# Patient Record
Sex: MALE | Race: WHITE | Employment: OTHER | RURAL
[De-identification: names, ages, dates, MRNs, and addresses within clinical notes are randomized per-mention and may not be internally consistent; named-entity substitution may affect disease eponyms.]

---

## 2017-04-06 ENCOUNTER — OFFICE VISIT (OUTPATIENT)
Dept: PRIMARY CARE CLINIC | Age: 57
End: 2017-04-06
Payer: COMMERCIAL

## 2017-04-06 VITALS
DIASTOLIC BLOOD PRESSURE: 70 MMHG | OXYGEN SATURATION: 98 % | RESPIRATION RATE: 18 BRPM | BODY MASS INDEX: 31.74 KG/M2 | SYSTOLIC BLOOD PRESSURE: 132 MMHG | WEIGHT: 234 LBS | HEART RATE: 66 BPM

## 2017-04-06 DIAGNOSIS — G25.81 RLS (RESTLESS LEGS SYNDROME): ICD-10-CM

## 2017-04-06 DIAGNOSIS — Z13.29 THYROID DISORDER SCREEN: ICD-10-CM

## 2017-04-06 DIAGNOSIS — H61.23 BILATERAL IMPACTED CERUMEN: ICD-10-CM

## 2017-04-06 DIAGNOSIS — E55.9 VITAMIN D DEFICIENCY: ICD-10-CM

## 2017-04-06 DIAGNOSIS — E78.00 PURE HYPERCHOLESTEROLEMIA: Primary | ICD-10-CM

## 2017-04-06 PROCEDURE — 99214 OFFICE O/P EST MOD 30 MIN: CPT | Performed by: NURSE PRACTITIONER

## 2017-04-06 PROCEDURE — 69209 REMOVE IMPACTED EAR WAX UNI: CPT | Performed by: NURSE PRACTITIONER

## 2017-04-06 RX ORDER — ROPINIROLE 0.25 MG/1
TABLET, FILM COATED ORAL
Qty: 60 TABLET | Refills: 5 | Status: SHIPPED | OUTPATIENT
Start: 2017-04-06 | End: 2017-10-19 | Stop reason: SDUPTHER

## 2017-04-06 ASSESSMENT — ENCOUNTER SYMPTOMS
SINUS PRESSURE: 0
NAUSEA: 0
ABDOMINAL PAIN: 0
EYE DISCHARGE: 0
WHEEZING: 0
BACK PAIN: 0
VOMITING: 0
SHORTNESS OF BREATH: 0
COUGH: 0

## 2017-06-05 ENCOUNTER — OFFICE VISIT (OUTPATIENT)
Dept: PRIMARY CARE CLINIC | Age: 57
End: 2017-06-05
Payer: COMMERCIAL

## 2017-06-05 VITALS
RESPIRATION RATE: 20 BRPM | HEART RATE: 59 BPM | HEIGHT: 68 IN | BODY MASS INDEX: 34.95 KG/M2 | DIASTOLIC BLOOD PRESSURE: 80 MMHG | SYSTOLIC BLOOD PRESSURE: 130 MMHG | OXYGEN SATURATION: 96 % | WEIGHT: 230.6 LBS

## 2017-06-05 DIAGNOSIS — H61.22 IMPACTED CERUMEN OF LEFT EAR: ICD-10-CM

## 2017-06-05 DIAGNOSIS — H65.02 ACUTE SEROUS OTITIS MEDIA OF LEFT EAR, RECURRENCE NOT SPECIFIED: Primary | ICD-10-CM

## 2017-06-05 DIAGNOSIS — E78.00 PURE HYPERCHOLESTEROLEMIA: ICD-10-CM

## 2017-06-05 DIAGNOSIS — M19.079 ARTHRITIS OF BIG TOE: ICD-10-CM

## 2017-06-05 PROCEDURE — 99214 OFFICE O/P EST MOD 30 MIN: CPT | Performed by: NURSE PRACTITIONER

## 2017-06-05 RX ORDER — MELOXICAM 15 MG/1
TABLET ORAL
Qty: 30 TABLET | Refills: 5 | Status: SHIPPED | OUTPATIENT
Start: 2017-06-05 | End: 2018-01-15 | Stop reason: SDUPTHER

## 2017-06-05 RX ORDER — CEFDINIR 300 MG/1
300 CAPSULE ORAL 2 TIMES DAILY
Qty: 20 CAPSULE | Refills: 0 | Status: SHIPPED | OUTPATIENT
Start: 2017-06-05 | End: 2017-06-15

## 2017-06-05 RX ORDER — PRAVASTATIN SODIUM 20 MG
TABLET ORAL
Qty: 30 TABLET | Refills: 5 | Status: SHIPPED | OUTPATIENT
Start: 2017-06-05 | End: 2018-01-15 | Stop reason: SDUPTHER

## 2017-06-05 RX ORDER — CIPROFLOXACIN AND DEXAMETHASONE 3; 1 MG/ML; MG/ML
4 SUSPENSION/ DROPS AURICULAR (OTIC) 2 TIMES DAILY
Qty: 7.5 ML | Refills: 0 | Status: SHIPPED | OUTPATIENT
Start: 2017-06-05 | End: 2017-06-12

## 2017-06-05 ASSESSMENT — PATIENT HEALTH QUESTIONNAIRE - PHQ9
SUM OF ALL RESPONSES TO PHQ QUESTIONS 1-9: 0
SUM OF ALL RESPONSES TO PHQ9 QUESTIONS 1 & 2: 0
2. FEELING DOWN, DEPRESSED OR HOPELESS: 0
1. LITTLE INTEREST OR PLEASURE IN DOING THINGS: 0

## 2017-06-05 ASSESSMENT — ENCOUNTER SYMPTOMS
EYES NEGATIVE: 1
GASTROINTESTINAL NEGATIVE: 1
RESPIRATORY NEGATIVE: 1

## 2017-06-08 ENCOUNTER — HOSPITAL ENCOUNTER (OUTPATIENT)
Dept: OTHER | Age: 57
Discharge: HOME | End: 2017-06-08
Attending: NURSE PRACTITIONER | Admitting: NURSE PRACTITIONER

## 2017-06-08 DIAGNOSIS — G25.81 RLS (RESTLESS LEGS SYNDROME): ICD-10-CM

## 2017-06-08 DIAGNOSIS — Z13.29 THYROID DISORDER SCREEN: ICD-10-CM

## 2017-06-08 DIAGNOSIS — E55.9 VITAMIN D DEFICIENCY: ICD-10-CM

## 2017-06-08 DIAGNOSIS — E78.00 PURE HYPERCHOLESTEROLEMIA: ICD-10-CM

## 2017-06-08 LAB
A/G RATIO: 1.4 (ref 0.8–2)
ALBUMIN SERPL-MCNC: 4.4 G/DL (ref 3.4–4.8)
ALP BLD-CCNC: 97 U/L (ref 25–100)
ALT SERPL-CCNC: 33 U/L (ref 4–36)
ANION GAP SERPL CALCULATED.3IONS-SCNC: 12 MMOL/L (ref 3–16)
AST SERPL-CCNC: 26 U/L (ref 8–33)
BILIRUB SERPL-MCNC: 0.5 MG/DL (ref 0.3–1.2)
BUN BLDV-MCNC: 22 MG/DL (ref 6–20)
CALCIUM SERPL-MCNC: 9.5 MG/DL (ref 8.5–10.5)
CHLORIDE BLD-SCNC: 104 MMOL/L (ref 98–107)
CHOLESTEROL, TOTAL: 161 MG/DL (ref 0–200)
CO2: 25 MMOL/L (ref 20–30)
CREAT SERPL-MCNC: 0.9 MG/DL (ref 0.4–1.2)
FOLATE: 18.96 NG/ML
GFR AFRICAN AMERICAN: >59
GFR NON-AFRICAN AMERICAN: >59
GLOBULIN: 3.1 G/DL
GLUCOSE BLD-MCNC: 105 MG/DL (ref 74–106)
HCT VFR BLD CALC: 44.4 % (ref 40–54)
HDLC SERPL-MCNC: 32 MG/DL (ref 40–60)
HEMOGLOBIN: 15.8 G/DL (ref 13–18)
LDL CHOLESTEROL CALCULATED: 108 MG/DL
MCH RBC QN AUTO: 29.9 PG (ref 27–32)
MCHC RBC AUTO-ENTMCNC: 35.6 G/DL (ref 31–35)
MCV RBC AUTO: 83.9 FL (ref 80–100)
PDW BLD-RTO: 13.2 % (ref 11–16)
PLATELET # BLD: 234 K/UL (ref 150–400)
PMV BLD AUTO: 11 FL (ref 6–10)
POTASSIUM SERPL-SCNC: 5.1 MMOL/L (ref 3.4–5.1)
RBC # BLD: 5.29 M/UL (ref 4.5–6)
SODIUM BLD-SCNC: 141 MMOL/L (ref 136–145)
TOTAL PROTEIN: 7.5 G/DL (ref 6.4–8.3)
TRIGL SERPL-MCNC: 104 MG/DL (ref 0–249)
TSH SERPL DL<=0.05 MIU/L-ACNC: 2.7 UIU/ML (ref 0.35–5.5)
VITAMIN B-12: 506 PG/ML (ref 211–911)
VITAMIN D 25-HYDROXY: 25 (ref 32–100)
VLDLC SERPL CALC-MCNC: 21 MG/DL
WBC # BLD: 5.6 K/UL (ref 4–11)

## 2017-06-19 ENCOUNTER — OFFICE VISIT (OUTPATIENT)
Dept: PRIMARY CARE CLINIC | Age: 57
End: 2017-06-19
Payer: COMMERCIAL

## 2017-06-19 VITALS
BODY MASS INDEX: 35.16 KG/M2 | WEIGHT: 232 LBS | HEART RATE: 67 BPM | HEIGHT: 68 IN | OXYGEN SATURATION: 98 % | SYSTOLIC BLOOD PRESSURE: 124 MMHG | DIASTOLIC BLOOD PRESSURE: 70 MMHG

## 2017-06-19 DIAGNOSIS — H61.23 BILATERAL IMPACTED CERUMEN: ICD-10-CM

## 2017-06-19 DIAGNOSIS — H92.03 EAR PAIN, BILATERAL: Primary | ICD-10-CM

## 2017-06-19 PROCEDURE — 69210 REMOVE IMPACTED EAR WAX UNI: CPT | Performed by: NURSE PRACTITIONER

## 2017-06-19 PROCEDURE — 99213 OFFICE O/P EST LOW 20 MIN: CPT | Performed by: NURSE PRACTITIONER

## 2017-06-19 ASSESSMENT — ENCOUNTER SYMPTOMS
RHINORRHEA: 0
EYE REDNESS: 0
EYE ITCHING: 0
CONSTIPATION: 0
COUGH: 0
EYE DISCHARGE: 0
NAUSEA: 0
DIARRHEA: 0
SORE THROAT: 0
SHORTNESS OF BREATH: 0
ABDOMINAL PAIN: 0
VOMITING: 0

## 2017-06-27 DIAGNOSIS — M19.079 ARTHRITIS OF BIG TOE: ICD-10-CM

## 2017-06-28 RX ORDER — MELOXICAM 15 MG/1
TABLET ORAL
Qty: 30 TABLET | Refills: 5 | Status: SHIPPED | OUTPATIENT
Start: 2017-06-28 | End: 2017-10-18 | Stop reason: SDUPTHER

## 2017-06-28 ASSESSMENT — ENCOUNTER SYMPTOMS
GASTROINTESTINAL NEGATIVE: 1
EYES NEGATIVE: 1
RESPIRATORY NEGATIVE: 1

## 2017-10-19 ENCOUNTER — OFFICE VISIT (OUTPATIENT)
Dept: PRIMARY CARE CLINIC | Age: 57
End: 2017-10-19
Payer: COMMERCIAL

## 2017-10-19 VITALS
RESPIRATION RATE: 20 BRPM | OXYGEN SATURATION: 96 % | WEIGHT: 235.4 LBS | DIASTOLIC BLOOD PRESSURE: 82 MMHG | BODY MASS INDEX: 35.68 KG/M2 | HEIGHT: 68 IN | SYSTOLIC BLOOD PRESSURE: 140 MMHG | HEART RATE: 68 BPM

## 2017-10-19 DIAGNOSIS — E55.9 VITAMIN D DEFICIENCY: ICD-10-CM

## 2017-10-19 DIAGNOSIS — G25.81 RLS (RESTLESS LEGS SYNDROME): ICD-10-CM

## 2017-10-19 DIAGNOSIS — Z13.29 THYROID DISORDER SCREEN: ICD-10-CM

## 2017-10-19 DIAGNOSIS — Z13.1 ENCOUNTER FOR SCREENING FOR DIABETES MELLITUS: ICD-10-CM

## 2017-10-19 DIAGNOSIS — E78.00 PURE HYPERCHOLESTEROLEMIA: Primary | ICD-10-CM

## 2017-10-19 DIAGNOSIS — R03.0 ELEVATED BLOOD PRESSURE READING: ICD-10-CM

## 2017-10-19 PROCEDURE — 99214 OFFICE O/P EST MOD 30 MIN: CPT | Performed by: NURSE PRACTITIONER

## 2017-10-19 RX ORDER — ROPINIROLE 0.25 MG/1
TABLET, FILM COATED ORAL
Qty: 60 TABLET | Refills: 5 | Status: SHIPPED | OUTPATIENT
Start: 2017-10-19 | End: 2018-01-19 | Stop reason: SDUPTHER

## 2017-10-19 ASSESSMENT — ENCOUNTER SYMPTOMS
RESPIRATORY NEGATIVE: 1
EYES NEGATIVE: 1
GASTROINTESTINAL NEGATIVE: 1

## 2017-10-19 NOTE — PROGRESS NOTES
SUBJECTIVE:    Patient ID: Heath Donato is a 65 y.o. male. Chief Complaint   Patient presents with    Hyperlipidemia    Leg Pain     rls         HPI:  He returns about his cholesterol and is restless leg syndrome. He states he is taken all his medications he denies any problems with them. 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 He continues to have a lot of problems with his hearing but he doesn't want to get hearing aids. He knows he needs to go have his eyes evaluated. He says he'll go to the eye doctor soon because he's having a lot of trouble reading even regular print. Patient's medications, allergies, past medical, surgical, social and family histories were reviewed and updated as appropriate. Current Outpatient Prescriptions:     rOPINIRole (REQUIP) 0.25 MG tablet, One po nightly  increase to 2 nightly as needed. ., Disp: 60 tablet, Rfl: 5    meloxicam (MOBIC) 15 MG tablet, take 1 tablet by mouth once daily, Disp: 30 tablet, Rfl: 5    pravastatin (PRAVACHOL) 20 MG tablet, take 1 tablet by mouth once daily, Disp: 30 tablet, Rfl: 5    Review of Systems   Constitutional: Negative. HENT: Positive for hearing loss. Eyes: Negative. Vision changes     Respiratory: Negative. Cardiovascular: Negative. Gastrointestinal: Negative. Genitourinary: Negative. Musculoskeletal: Negative. Skin: Negative. Neurological: Negative. Psychiatric/Behavioral: Negative. OBJECTIVE:  BP (!) 140/82   Pulse 68   Resp 20   Ht 5' 8\" (1.727 m)   Wt 235 lb 6.4 oz (106.8 kg)   SpO2 96%   BMI 35.79 kg/m²    Physical Exam   Constitutional: He is oriented to person, place, and time. He appears well-developed and well-nourished. HENT:   Head: Normocephalic and atraumatic. Eyes: Conjunctivae and EOM are normal. Pupils are equal, round, and reactive to light. Neck: Normal range of motion.

## 2017-10-19 NOTE — PROGRESS NOTES
Have you seen any other physician or provider since your last visit? no    Have you had any other diagnostic tests since your last visit? no    Have you changed or stopped any medications since your last visit? No    Patient here for follow up on hyperlipidemia and rls, he did not have labs done.

## 2017-11-21 ENCOUNTER — HOSPITAL ENCOUNTER (OUTPATIENT)
Dept: OTHER | Age: 57
Discharge: HOME | End: 2017-11-21
Attending: NURSE PRACTITIONER | Admitting: NURSE PRACTITIONER

## 2017-11-21 ENCOUNTER — TELEPHONE (OUTPATIENT)
Dept: PRIMARY CARE CLINIC | Age: 57
End: 2017-11-21

## 2017-11-21 DIAGNOSIS — Z13.1 ENCOUNTER FOR SCREENING FOR DIABETES MELLITUS: ICD-10-CM

## 2017-11-21 DIAGNOSIS — E55.9 VITAMIN D DEFICIENCY: ICD-10-CM

## 2017-11-21 DIAGNOSIS — E78.00 PURE HYPERCHOLESTEROLEMIA: ICD-10-CM

## 2017-11-21 DIAGNOSIS — Z13.29 THYROID DISORDER SCREEN: ICD-10-CM

## 2017-11-21 LAB
A/G RATIO: 1.6 (ref 0.8–2)
ALBUMIN SERPL-MCNC: 4.5 G/DL (ref 3.4–4.8)
ALP BLD-CCNC: 103 U/L (ref 25–100)
ALT SERPL-CCNC: 33 U/L (ref 4–36)
ANION GAP SERPL CALCULATED.3IONS-SCNC: 11 MMOL/L (ref 3–16)
AST SERPL-CCNC: 22 U/L (ref 8–33)
BILIRUB SERPL-MCNC: 0.4 MG/DL (ref 0.3–1.2)
BUN BLDV-MCNC: 23 MG/DL (ref 6–20)
CALCIUM SERPL-MCNC: 9.8 MG/DL (ref 8.5–10.5)
CHLORIDE BLD-SCNC: 103 MMOL/L (ref 98–107)
CHOLESTEROL, TOTAL: 169 MG/DL (ref 0–200)
CO2: 27 MMOL/L (ref 20–30)
CREAT SERPL-MCNC: 0.9 MG/DL (ref 0.4–1.2)
FOLATE: 12.45 NG/ML
GFR AFRICAN AMERICAN: >59
GFR NON-AFRICAN AMERICAN: >59
GLOBULIN: 2.9 G/DL
GLUCOSE BLD-MCNC: 99 MG/DL (ref 74–106)
HCT VFR BLD CALC: 44.2 % (ref 40–54)
HDLC SERPL-MCNC: 29 MG/DL (ref 40–60)
HEMOGLOBIN: 15.7 G/DL (ref 13–18)
LDL CHOLESTEROL CALCULATED: 110 MG/DL
MCH RBC QN AUTO: 30.4 PG (ref 27–32)
MCHC RBC AUTO-ENTMCNC: 35.5 G/DL (ref 31–35)
MCV RBC AUTO: 85.7 FL (ref 80–100)
PDW BLD-RTO: 13.4 % (ref 11–16)
PLATELET # BLD: 227 K/UL (ref 150–400)
PMV BLD AUTO: 10.9 FL (ref 6–10)
POTASSIUM SERPL-SCNC: 5.1 MMOL/L (ref 3.4–5.1)
RBC # BLD: 5.16 M/UL (ref 4.5–6)
SODIUM BLD-SCNC: 141 MMOL/L (ref 136–145)
TOTAL PROTEIN: 7.4 G/DL (ref 6.4–8.3)
TRIGL SERPL-MCNC: 151 MG/DL (ref 0–249)
TSH SERPL DL<=0.05 MIU/L-ACNC: 2.31 UIU/ML (ref 0.35–5.5)
VITAMIN B-12: 370 PG/ML (ref 211–911)
VITAMIN D 25-HYDROXY: 19 (ref 32–100)
VLDLC SERPL CALC-MCNC: 30 MG/DL
WBC # BLD: 5.5 K/UL (ref 4–11)

## 2017-11-21 NOTE — TELEPHONE ENCOUNTER
----- Message from EULA Gamino sent at 11/21/2017 11:48 AM EST -----  Vit d is low. Vit D3 5000 iu daily.

## 2018-01-15 DIAGNOSIS — M19.079 ARTHRITIS OF BIG TOE: ICD-10-CM

## 2018-01-15 DIAGNOSIS — E78.00 PURE HYPERCHOLESTEROLEMIA: ICD-10-CM

## 2018-01-15 RX ORDER — PRAVASTATIN SODIUM 20 MG
TABLET ORAL
Qty: 30 TABLET | Refills: 5 | Status: SHIPPED | OUTPATIENT
Start: 2018-01-15 | End: 2018-01-19 | Stop reason: SDUPTHER

## 2018-01-15 RX ORDER — MELOXICAM 15 MG/1
TABLET ORAL
Qty: 30 TABLET | Refills: 5 | Status: SHIPPED | OUTPATIENT
Start: 2018-01-15 | End: 2018-08-10 | Stop reason: SDUPTHER

## 2018-01-15 RX ORDER — PRAVASTATIN SODIUM 20 MG
TABLET ORAL
Qty: 30 TABLET | Refills: 5 | Status: SHIPPED | OUTPATIENT
Start: 2018-01-15 | End: 2018-08-10 | Stop reason: SDUPTHER

## 2018-01-15 RX ORDER — MELOXICAM 15 MG/1
TABLET ORAL
Qty: 30 TABLET | Refills: 5 | Status: SHIPPED | OUTPATIENT
Start: 2018-01-15 | End: 2018-01-19 | Stop reason: SDUPTHER

## 2018-01-19 ENCOUNTER — OFFICE VISIT (OUTPATIENT)
Dept: PRIMARY CARE CLINIC | Age: 58
End: 2018-01-19
Payer: COMMERCIAL

## 2018-01-19 VITALS
HEART RATE: 76 BPM | SYSTOLIC BLOOD PRESSURE: 136 MMHG | DIASTOLIC BLOOD PRESSURE: 72 MMHG | WEIGHT: 237.2 LBS | RESPIRATION RATE: 18 BRPM | BODY MASS INDEX: 36.07 KG/M2 | OXYGEN SATURATION: 97 %

## 2018-01-19 DIAGNOSIS — G25.81 RLS (RESTLESS LEGS SYNDROME): ICD-10-CM

## 2018-01-19 DIAGNOSIS — E78.00 PURE HYPERCHOLESTEROLEMIA: Primary | ICD-10-CM

## 2018-01-19 DIAGNOSIS — E55.9 VITAMIN D DEFICIENCY: ICD-10-CM

## 2018-01-19 DIAGNOSIS — Z13.29 THYROID DISORDER SCREEN: ICD-10-CM

## 2018-01-19 PROCEDURE — 99213 OFFICE O/P EST LOW 20 MIN: CPT | Performed by: NURSE PRACTITIONER

## 2018-01-19 RX ORDER — ROPINIROLE 0.25 MG/1
TABLET, FILM COATED ORAL
Qty: 60 TABLET | Refills: 5 | Status: SHIPPED | OUTPATIENT
Start: 2018-01-19 | End: 2019-02-28 | Stop reason: SDUPTHER

## 2018-01-19 RX ORDER — ERGOCALCIFEROL 1.25 MG/1
50000 CAPSULE ORAL WEEKLY
Qty: 4 CAPSULE | Refills: 5 | Status: SHIPPED | OUTPATIENT
Start: 2018-01-19 | End: 2018-04-19 | Stop reason: SDUPTHER

## 2018-01-29 ASSESSMENT — ENCOUNTER SYMPTOMS
GASTROINTESTINAL NEGATIVE: 1
EYES NEGATIVE: 1
RESPIRATORY NEGATIVE: 1

## 2018-01-29 NOTE — PROGRESS NOTES
gallop and no friction rub. No murmur heard. Pulmonary/Chest: Effort normal and breath sounds normal. No respiratory distress. He has no wheezes. He has no rales. Abdominal: Soft. Bowel sounds are normal. He exhibits no distension. There is no tenderness. There is no guarding. Musculoskeletal: He exhibits no edema or tenderness. Neurological: He is alert and oriented to person, place, and time. Skin: Skin is warm and dry. No rash noted. Psychiatric: He has a normal mood and affect. Judgment normal.   Nursing note and vitals reviewed. No results found for requested labs within last 30 days. LDL Calculated (mg/dL)   Date Value   11/21/2017 110         Lab Results   Component Value Date    WBC 5.5 11/21/2017    HGB 15.7 11/21/2017    HCT 44.2 11/21/2017    MCV 85.7 11/21/2017     11/21/2017       Lab Results   Component Value Date    TSH 2.31 11/21/2017         ASSESSMENT/PLAN:     1. RLS (restless legs syndrome)    - rOPINIRole (REQUIP) 0.25 MG tablet; One po nightly  increase to 2 nightly as needed. .  Dispense: 60 tablet; Refill: 5    2. Pure hypercholesterolemia    - Comprehensive Metabolic Panel; Future  - CBC; Future  - Lipid Panel; Future    3. Vitamin D deficiency    - Vitamin B12 & Folate; Future  - Vitamin D 25 Hydroxy; Future    4. Thyroid disorder screen    - TSH without Reflex;  Future

## 2018-04-02 ENCOUNTER — HOSPITAL ENCOUNTER (OUTPATIENT)
Dept: OTHER | Age: 58
Discharge: HOME | End: 2018-04-02
Attending: NURSE PRACTITIONER | Admitting: NURSE PRACTITIONER

## 2018-04-02 DIAGNOSIS — Z13.29 THYROID DISORDER SCREEN: ICD-10-CM

## 2018-04-02 DIAGNOSIS — E55.9 VITAMIN D DEFICIENCY: ICD-10-CM

## 2018-04-02 DIAGNOSIS — E78.00 PURE HYPERCHOLESTEROLEMIA: ICD-10-CM

## 2018-04-02 LAB
A/G RATIO: 1.8 (ref 0.8–2)
ALBUMIN SERPL-MCNC: 4.7 G/DL (ref 3.4–4.8)
ALP BLD-CCNC: 102 U/L (ref 25–100)
ALT SERPL-CCNC: 28 U/L (ref 4–36)
ANION GAP SERPL CALCULATED.3IONS-SCNC: 12 MMOL/L (ref 3–16)
AST SERPL-CCNC: 21 U/L (ref 8–33)
BILIRUB SERPL-MCNC: 0.6 MG/DL (ref 0.3–1.2)
BUN BLDV-MCNC: 23 MG/DL (ref 6–20)
CALCIUM SERPL-MCNC: 9.5 MG/DL (ref 8.5–10.5)
CHLORIDE BLD-SCNC: 101 MMOL/L (ref 98–107)
CHOLESTEROL, TOTAL: 167 MG/DL (ref 0–200)
CO2: 26 MMOL/L (ref 20–30)
CREAT SERPL-MCNC: 1 MG/DL (ref 0.4–1.2)
FOLATE: 13.78 NG/ML
GFR AFRICAN AMERICAN: >59
GFR NON-AFRICAN AMERICAN: >59
GLOBULIN: 2.6 G/DL
GLUCOSE BLD-MCNC: 94 MG/DL (ref 74–106)
HCT VFR BLD CALC: 46.2 % (ref 40–54)
HDLC SERPL-MCNC: 31 MG/DL (ref 40–60)
HEMOGLOBIN: 15.6 G/DL (ref 13–18)
LDL CHOLESTEROL CALCULATED: 108 MG/DL
MCH RBC QN AUTO: 29.6 PG (ref 27–32)
MCHC RBC AUTO-ENTMCNC: 33.8 G/DL (ref 31–35)
MCV RBC AUTO: 87.7 FL (ref 80–100)
PDW BLD-RTO: 12.9 % (ref 11–16)
PLATELET # BLD: 244 K/UL (ref 150–400)
PMV BLD AUTO: 10.8 FL (ref 6–10)
POTASSIUM SERPL-SCNC: 5.2 MMOL/L (ref 3.4–5.1)
RBC # BLD: 5.27 M/UL (ref 4.5–6)
SODIUM BLD-SCNC: 139 MMOL/L (ref 136–145)
TOTAL PROTEIN: 7.3 G/DL (ref 6.4–8.3)
TRIGL SERPL-MCNC: 139 MG/DL (ref 0–249)
TSH SERPL DL<=0.05 MIU/L-ACNC: 3.62 UIU/ML (ref 0.35–5.5)
VITAMIN B-12: 499 PG/ML (ref 211–911)
VITAMIN D 25-HYDROXY: 29.9 (ref 32–100)
VLDLC SERPL CALC-MCNC: 28 MG/DL
WBC # BLD: 5.9 K/UL (ref 4–11)

## 2018-04-19 ENCOUNTER — OFFICE VISIT (OUTPATIENT)
Dept: PRIMARY CARE CLINIC | Age: 58
End: 2018-04-19
Payer: COMMERCIAL

## 2018-04-19 ENCOUNTER — HOSPITAL ENCOUNTER (OUTPATIENT)
Dept: OTHER | Age: 58
Discharge: HOME | End: 2018-04-19
Attending: NURSE PRACTITIONER | Admitting: NURSE PRACTITIONER

## 2018-04-19 VITALS
HEIGHT: 68 IN | BODY MASS INDEX: 35.8 KG/M2 | TEMPERATURE: 97.6 F | HEART RATE: 59 BPM | DIASTOLIC BLOOD PRESSURE: 86 MMHG | WEIGHT: 236.2 LBS | OXYGEN SATURATION: 97 % | SYSTOLIC BLOOD PRESSURE: 134 MMHG | RESPIRATION RATE: 16 BRPM

## 2018-04-19 DIAGNOSIS — G89.29 CHRONIC RIGHT SHOULDER PAIN: ICD-10-CM

## 2018-04-19 DIAGNOSIS — M25.511 CHRONIC RIGHT SHOULDER PAIN: ICD-10-CM

## 2018-04-19 DIAGNOSIS — G89.29 CHRONIC RIGHT SHOULDER PAIN: Primary | ICD-10-CM

## 2018-04-19 DIAGNOSIS — M25.511 CHRONIC RIGHT SHOULDER PAIN: Primary | ICD-10-CM

## 2018-04-19 PROCEDURE — 99214 OFFICE O/P EST MOD 30 MIN: CPT | Performed by: NURSE PRACTITIONER

## 2018-04-19 RX ORDER — METHOCARBAMOL 500 MG/1
500 TABLET, FILM COATED ORAL 4 TIMES DAILY
Qty: 40 TABLET | Refills: 0 | Status: SHIPPED | OUTPATIENT
Start: 2018-04-19 | End: 2018-04-29

## 2018-04-19 RX ORDER — TRAMADOL HYDROCHLORIDE 50 MG/1
50 TABLET ORAL EVERY 4 HOURS PRN
Qty: 30 TABLET | Refills: 0 | Status: SHIPPED | OUTPATIENT
Start: 2018-04-19 | End: 2018-04-24

## 2018-04-19 RX ORDER — ERGOCALCIFEROL 1.25 MG/1
50000 CAPSULE ORAL WEEKLY
Qty: 4 CAPSULE | Refills: 5 | Status: SHIPPED | OUTPATIENT
Start: 2018-04-19 | End: 2019-02-25 | Stop reason: SDUPTHER

## 2018-04-19 ASSESSMENT — ENCOUNTER SYMPTOMS
EYES NEGATIVE: 1
GASTROINTESTINAL NEGATIVE: 1
RESPIRATORY NEGATIVE: 1

## 2018-04-25 ENCOUNTER — TELEPHONE (OUTPATIENT)
Dept: PRIMARY CARE CLINIC | Age: 58
End: 2018-04-25

## 2018-05-16 ENCOUNTER — OFFICE VISIT (OUTPATIENT)
Dept: PRIMARY CARE CLINIC | Age: 58
End: 2018-05-16
Payer: COMMERCIAL

## 2018-05-16 VITALS
HEART RATE: 76 BPM | DIASTOLIC BLOOD PRESSURE: 86 MMHG | RESPIRATION RATE: 16 BRPM | HEIGHT: 68 IN | SYSTOLIC BLOOD PRESSURE: 126 MMHG | OXYGEN SATURATION: 95 % | TEMPERATURE: 97.5 F | BODY MASS INDEX: 35.22 KG/M2 | WEIGHT: 232.4 LBS

## 2018-05-16 DIAGNOSIS — G89.29 CHRONIC RIGHT SHOULDER PAIN: Primary | ICD-10-CM

## 2018-05-16 DIAGNOSIS — M25.511 CHRONIC RIGHT SHOULDER PAIN: Primary | ICD-10-CM

## 2018-05-16 PROCEDURE — 99213 OFFICE O/P EST LOW 20 MIN: CPT | Performed by: NURSE PRACTITIONER

## 2018-05-16 RX ORDER — TRAMADOL HYDROCHLORIDE 50 MG/1
50 TABLET ORAL
COMMUNITY
Start: 2018-04-19 | End: 2019-03-11

## 2018-05-16 RX ORDER — METHOCARBAMOL 500 MG/1
TABLET, FILM COATED ORAL
COMMUNITY
Start: 2018-04-19 | End: 2019-03-11 | Stop reason: ALTCHOICE

## 2018-05-16 ASSESSMENT — ENCOUNTER SYMPTOMS
GASTROINTESTINAL NEGATIVE: 1
EYES NEGATIVE: 1
RESPIRATORY NEGATIVE: 1

## 2018-06-07 ENCOUNTER — HOSPITAL ENCOUNTER (OUTPATIENT)
Dept: PHYSICAL THERAPY | Age: 58
Discharge: HOME | End: 2018-06-30
Admitting: NURSE PRACTITIONER

## 2018-06-07 ASSESSMENT — PAIN DESCRIPTION - LOCATION: LOCATION: SHOULDER

## 2018-06-07 ASSESSMENT — PAIN DESCRIPTION - PROGRESSION: CLINICAL_PROGRESSION: NOT CHANGED

## 2018-06-07 ASSESSMENT — PAIN DESCRIPTION - PAIN TYPE: TYPE: ACUTE PAIN

## 2018-06-07 ASSESSMENT — PAIN DESCRIPTION - FREQUENCY: FREQUENCY: INTERMITTENT

## 2018-06-07 ASSESSMENT — PAIN SCALES - GENERAL: PAINLEVEL_OUTOF10: 3

## 2018-06-07 ASSESSMENT — PAIN DESCRIPTION - ONSET: ONSET: ON-GOING

## 2018-06-07 ASSESSMENT — PAIN DESCRIPTION - ORIENTATION: ORIENTATION: RIGHT

## 2018-06-07 NOTE — PROGRESS NOTES
Physical Therapy  Initial Assessment  Date: 18  Patient Name: Terrell Mcardle  MRN: [id number]  : 1960     Treatment Diagnosis: Right shoulder pain; RTC tendinitis; joint stiffness    Restrictions  Restrictions/Precautions  Restrictions/Precautions: General Precautions  Required Braces or Orthoses?: No    Subjective   General  Chart Reviewed: Yes  Patient assessed for rehabilitation services?: Yes  Response To Previous Treatment: Not applicable  Family / Caregiver Present: No  Referring Practitioner: EULA Rod Sa  Diagnosis: Right shoulder pain  Follows Commands: Within Functional Limits  General Comment  Comments: denies specific injury, but 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   Pain Screening  Patient Currently in Pain: Yes  Pain Assessment  Clinical Progression: Not changed  Vital Signs  Patient Currently in Pain: Yes    Vision/Hearing  Vision  Vision: Within Functional Limits  Hearing  Hearing: Within functional limits    Orientation  Orientation  Overall Orientation Status: Within Normal Limits    Social/Functional History  Social/Functional History  Lives With: Spouse  Active : Yes  Mode of Transportation: Car  Occupation: Self employed  Type of occupation: Smith; farming  Objective     Observation/Palpation  Posture: Fair  Palpation: Tenderness over right UT, Re-education, Home Exercise Program, Manual Therapy - Soft Tissue Mobilization, Modalities    G-Code  PT G-Codes  Functional Assessment Tool Used: Quick DASH  Score: 21  Functional Limitation: Carrying, moving and handling objects  Carrying, Moving and Handling Objects Current Status (): At least 20 percent but less than 40 percent impaired, limited or restricted  Carrying, Moving and Handling Objects Goal Status (): At least 1 percent but less than 20 percent impaired, limited or restricted                   Goals  Short term goals  Time Frame for Short term goals: 3-4 weeks  Short term goal 1: Achieve right shoulder pain at or less than 2/10 with routine daily activities. Short term goal 2: Achieve right shoulder AROM: FF = 160, ABD = 140, IR =70, ER = 75. Short term goal 3: Achieve 4+/5 right shoulder strength in MMG's. Short term goal 4: Independent with HEP. Short term goal 5: Achieve a Quick DASH score of 16 or better. Long term goals  Time Frame for Long term goals : 6 weeks  Long term goal 1: Achieve right shoulder pain at or less than 1/10 with routine daily activities. Long term goal 2: Achieve 5-/5 right shoulder strength in MMG's. Long term goal 3: Achieve a Quick DASH score of 10 or better. Patient Goals   Patient goals : alleviate shoulder pain       Therapy Time   Individual Concurrent Group Co-treatment   Time In           Time Out           Minutes                   Electronically signed by Luna Peters PT on 6/8/2018 at 46:98 AM      Certification of Medical Necessity: It will be understood that this treatment plan is certified medically necessary by the documenting therapist and referring physician mentioned in this report. Unless the physician indicated otherwise through written correspondence with our office, all further referrals will act as certification of medical necessity on this treatment plan.       Thank you for this referral.  If you have questions regarding this plan

## 2018-06-08 ASSESSMENT — PAIN DESCRIPTION - PROGRESSION: CLINICAL_PROGRESSION: NOT CHANGED

## 2018-06-22 ENCOUNTER — HOSPITAL ENCOUNTER (OUTPATIENT)
Dept: PHYSICAL THERAPY | Age: 58
Discharge: HOME | End: 2018-06-25
Admitting: NURSE PRACTITIONER

## 2018-07-01 ENCOUNTER — HOSPITAL ENCOUNTER (OUTPATIENT)
Dept: OTHER | Age: 58
Discharge: HOME | End: 2018-07-01
Attending: NURSE PRACTITIONER | Admitting: NURSE PRACTITIONER

## 2018-08-10 DIAGNOSIS — E78.00 PURE HYPERCHOLESTEROLEMIA: ICD-10-CM

## 2018-08-10 DIAGNOSIS — M19.079 ARTHRITIS OF BIG TOE: ICD-10-CM

## 2018-08-10 RX ORDER — PRAVASTATIN SODIUM 20 MG
TABLET ORAL
Qty: 30 TABLET | Refills: 5 | Status: SHIPPED | OUTPATIENT
Start: 2018-08-10 | End: 2019-02-25 | Stop reason: SDUPTHER

## 2018-08-10 RX ORDER — MELOXICAM 15 MG/1
TABLET ORAL
Qty: 30 TABLET | Refills: 5 | Status: SHIPPED | OUTPATIENT
Start: 2018-08-10 | End: 2019-02-25 | Stop reason: SDUPTHER

## 2018-12-20 NOTE — DISCHARGE SUMMARY
Physical Therapy Discharge Summary    Patient attended initial evaluation + 1 visit, and did not return for further treatment. Patient will be discharged due to not returning to complete established plan of care. Goals were therefore not met.   Electronically signed by Edmund Izquierdo PT on 12/20/2018 at 3:01 PM

## 2019-02-25 DIAGNOSIS — M19.079 ARTHRITIS OF BIG TOE: ICD-10-CM

## 2019-02-25 DIAGNOSIS — E78.00 PURE HYPERCHOLESTEROLEMIA: ICD-10-CM

## 2019-02-25 RX ORDER — ERGOCALCIFEROL 1.25 MG/1
50000 CAPSULE ORAL WEEKLY
Qty: 4 CAPSULE | Refills: 0 | Status: SHIPPED | OUTPATIENT
Start: 2019-02-25 | End: 2019-03-11 | Stop reason: SDUPTHER

## 2019-02-25 RX ORDER — MELOXICAM 15 MG/1
TABLET ORAL
Qty: 30 TABLET | Refills: 0 | Status: SHIPPED | OUTPATIENT
Start: 2019-02-25 | End: 2019-03-11 | Stop reason: SDUPTHER

## 2019-02-25 RX ORDER — PRAVASTATIN SODIUM 20 MG
TABLET ORAL
Qty: 30 TABLET | Refills: 0 | Status: SHIPPED | OUTPATIENT
Start: 2019-02-25 | End: 2019-03-11 | Stop reason: SDUPTHER

## 2019-02-28 DIAGNOSIS — G25.81 RLS (RESTLESS LEGS SYNDROME): ICD-10-CM

## 2019-02-28 RX ORDER — ROPINIROLE 0.25 MG/1
TABLET, FILM COATED ORAL
Qty: 60 TABLET | Refills: 2 | Status: SHIPPED | OUTPATIENT
Start: 2019-02-28 | End: 2019-03-11 | Stop reason: SDUPTHER

## 2019-03-11 ENCOUNTER — APPOINTMENT (OUTPATIENT)
Dept: LAB | Facility: HOSPITAL | Age: 59
End: 2019-03-11

## 2019-03-11 ENCOUNTER — TRANSCRIBE ORDERS (OUTPATIENT)
Dept: ADMINISTRATIVE | Facility: HOSPITAL | Age: 59
End: 2019-03-11

## 2019-03-11 ENCOUNTER — OFFICE VISIT (OUTPATIENT)
Dept: PRIMARY CARE CLINIC | Age: 59
End: 2019-03-11
Payer: COMMERCIAL

## 2019-03-11 VITALS
HEIGHT: 68 IN | TEMPERATURE: 97.8 F | BODY MASS INDEX: 35.31 KG/M2 | HEART RATE: 58 BPM | SYSTOLIC BLOOD PRESSURE: 112 MMHG | OXYGEN SATURATION: 95 % | DIASTOLIC BLOOD PRESSURE: 86 MMHG | WEIGHT: 233 LBS | RESPIRATION RATE: 16 BRPM

## 2019-03-11 DIAGNOSIS — Z13.29 SCREENING FOR THYROID DISORDER: Primary | ICD-10-CM

## 2019-03-11 DIAGNOSIS — E55.9 MILD VITAMIN D DEFICIENCY: ICD-10-CM

## 2019-03-11 DIAGNOSIS — E55.9 VITAMIN D DEFICIENCY: ICD-10-CM

## 2019-03-11 DIAGNOSIS — E78.00 PURE HYPERCHOLESTEROLEMIA: ICD-10-CM

## 2019-03-11 DIAGNOSIS — E78.00 PURE HYPERCHOLESTEROLEMIA: Primary | ICD-10-CM

## 2019-03-11 DIAGNOSIS — M19.079 ARTHRITIS OF BIG TOE: ICD-10-CM

## 2019-03-11 DIAGNOSIS — Z13.29 THYROID DISORDER SCREEN: ICD-10-CM

## 2019-03-11 DIAGNOSIS — G25.81 RLS (RESTLESS LEGS SYNDROME): ICD-10-CM

## 2019-03-11 LAB
25(OH)D3 SERPL-MCNC: 30.1 NG/ML
ALBUMIN SERPL-MCNC: 4.6 G/DL
ALBUMIN SERPL-MCNC: 4.6 G/DL (ref 3.5–5)
ALBUMIN/GLOB SERPL: 1.4 G/DL (ref 1–2)
ALP BLD-CCNC: 95 U/L
ALP SERPL-CCNC: 95 U/L (ref 38–126)
ALT SERPL W P-5'-P-CCNC: 39 U/L (ref 13–69)
ALT SERPL-CCNC: 39 U/L
ANION GAP SERPL CALCULATED.3IONS-SCNC: 12.8 MMOL/L
ANION GAP SERPL CALCULATED.3IONS-SCNC: 12.8 MMOL/L (ref 10–20)
AST SERPL-CCNC: 31 U/L
AST SERPL-CCNC: 31 U/L (ref 15–46)
BASOPHILS # BLD AUTO: 0.03 10*3/MM3 (ref 0–0.2)
BASOPHILS NFR BLD AUTO: 0.6 % (ref 0–2.5)
BILIRUB SERPL-MCNC: 0.5 MG/DL (ref 0.1–1.4)
BILIRUB SERPL-MCNC: 0.5 MG/DL (ref 0.2–1.3)
BUN BLD-MCNC: 25 MG/DL (ref 7–20)
BUN BLDV-MCNC: 25 MG/DL
BUN/CREAT SERPL: 27.8 (ref 6.3–21.9)
CALCIUM SERPL-MCNC: 9.9 MG/DL
CALCIUM SPEC-SCNC: 9.9 MG/DL (ref 8.4–10.2)
CHLORIDE BLD-SCNC: 106 MMOL/L
CHLORIDE SERPL-SCNC: 106 MMOL/L (ref 98–107)
CHOLEST SERPL-MCNC: 175 MG/DL (ref 0–199)
CHOLESTEROL, TOTAL: 175 MG/DL
CHOLESTEROL/HDL RATIO: 3.28
CO2 SERPL-SCNC: 27 MMOL/L (ref 26–30)
CO2: 27 MMOL/L
CREAT BLD-MCNC: 0.9 MG/DL (ref 0.6–1.3)
CREAT SERPL-MCNC: 0.9 MG/DL
DEPRECATED RDW RBC AUTO: 41.5 FL (ref 37–54)
EOSINOPHIL # BLD AUTO: 0.14 10*3/MM3 (ref 0–0.7)
EOSINOPHIL NFR BLD AUTO: 2.6 % (ref 0–7)
ERYTHROCYTE [DISTWIDTH] IN BLOOD BY AUTOMATED COUNT: 12.8 % (ref 11.5–14.5)
FOLATE: NORMAL
GFR CALCULATED: ABNORMAL
GFR SERPL CREATININE-BSD FRML MDRD: 87 ML/MIN/1.73
GLOBULIN UR ELPH-MCNC: 3.4 GM/DL
GLUCOSE BLD-MCNC: 102 MG/DL
GLUCOSE BLD-MCNC: 102 MG/DL (ref 74–98)
HCT VFR BLD AUTO: 46.3 % (ref 42–52)
HDLC SERPL-MCNC: 34 MG/DL (ref 35–70)
HDLC SERPL-MCNC: 34 MG/DL (ref 40–60)
HGB BLD-MCNC: 15.7 G/DL (ref 14–18)
IMM GRANULOCYTES # BLD AUTO: 0.01 10*3/MM3 (ref 0–0.06)
IMM GRANULOCYTES NFR BLD AUTO: 0.2 % (ref 0–0.6)
LDL CHOLESTEROL CALCULATED: 112 MG/DL (ref 0–160)
LDLC SERPL CALC-MCNC: 112 MG/DL (ref 0–99)
LDLC/HDLC SERPL: 3.28 {RATIO}
LYMPHOCYTES # BLD AUTO: 1.84 10*3/MM3 (ref 0.6–3.4)
LYMPHOCYTES NFR BLD AUTO: 34.8 % (ref 10–50)
MCH RBC QN AUTO: 29.8 PG (ref 27–31)
MCHC RBC AUTO-ENTMCNC: 33.9 G/DL (ref 30–37)
MCV RBC AUTO: 88 FL (ref 80–94)
MONOCYTES # BLD AUTO: 0.42 10*3/MM3 (ref 0–0.9)
MONOCYTES NFR BLD AUTO: 7.9 % (ref 0–12)
NEUTROPHILS # BLD AUTO: 2.85 10*3/MM3 (ref 2–6.9)
NEUTROPHILS NFR BLD AUTO: 53.9 % (ref 37–80)
NRBC BLD AUTO-RTO: 0 /100 WBC (ref 0–0)
PLATELET # BLD AUTO: 230 10*3/MM3 (ref 130–400)
PMV BLD AUTO: 10.7 FL (ref 6–12)
POTASSIUM BLD-SCNC: 4.8 MMOL/L (ref 3.5–5.1)
POTASSIUM SERPL-SCNC: 4.8 MMOL/L
PROT SERPL-MCNC: 8 G/DL (ref 6.3–8.2)
RBC # BLD AUTO: 5.26 10*6/MM3 (ref 4.7–6.1)
SODIUM BLD-SCNC: 141 MMOL/L
SODIUM BLD-SCNC: 141 MMOL/L (ref 137–145)
TOTAL PROTEIN: 8
TRIGL SERPL-MCNC: 147 MG/DL
TRIGL SERPL-MCNC: 147 MG/DL
TSH SERPL DL<=0.05 MIU/L-ACNC: 2.51 MIU/ML (ref 0.47–4.68)
TSH SERPL DL<=0.05 MIU/L-ACNC: 2.51 UIU/ML
VIT B12 BLD-MCNC: 365 PG/ML (ref 239–931)
VITAMIN B-12: 365
VITAMIN D 25-HYDROXY: 30.1
VITAMIN D2, 25 HYDROXY: NORMAL
VITAMIN D3,25 HYDROXY: NORMAL
VLDLC SERPL CALC-MCNC: 29.4 MG/DL
VLDLC SERPL-MCNC: 29.4 MG/DL
WBC NRBC COR # BLD: 5.29 10*3/MM3 (ref 4.8–10.8)

## 2019-03-11 PROCEDURE — 84443 ASSAY THYROID STIM HORMONE: CPT | Performed by: NURSE PRACTITIONER

## 2019-03-11 PROCEDURE — 99213 OFFICE O/P EST LOW 20 MIN: CPT | Performed by: NURSE PRACTITIONER

## 2019-03-11 PROCEDURE — 80053 COMPREHEN METABOLIC PANEL: CPT | Performed by: NURSE PRACTITIONER

## 2019-03-11 PROCEDURE — 82306 VITAMIN D 25 HYDROXY: CPT | Performed by: NURSE PRACTITIONER

## 2019-03-11 PROCEDURE — 36415 COLL VENOUS BLD VENIPUNCTURE: CPT | Performed by: NURSE PRACTITIONER

## 2019-03-11 PROCEDURE — 80061 LIPID PANEL: CPT | Performed by: NURSE PRACTITIONER

## 2019-03-11 PROCEDURE — 82607 VITAMIN B-12: CPT | Performed by: NURSE PRACTITIONER

## 2019-03-11 PROCEDURE — 85025 COMPLETE CBC W/AUTO DIFF WBC: CPT | Performed by: NURSE PRACTITIONER

## 2019-03-11 RX ORDER — MELOXICAM 15 MG/1
TABLET ORAL
Qty: 30 TABLET | Refills: 5 | Status: SHIPPED | OUTPATIENT
Start: 2019-03-11 | End: 2019-10-28 | Stop reason: SDUPTHER

## 2019-03-11 RX ORDER — PRAVASTATIN SODIUM 20 MG
TABLET ORAL
Qty: 30 TABLET | Refills: 5 | Status: SHIPPED | OUTPATIENT
Start: 2019-03-11 | End: 2019-11-19 | Stop reason: SDUPTHER

## 2019-03-11 RX ORDER — ROPINIROLE 0.25 MG/1
TABLET, FILM COATED ORAL
Qty: 60 TABLET | Refills: 5 | Status: SHIPPED | OUTPATIENT
Start: 2019-03-11 | End: 2019-11-11 | Stop reason: SDUPTHER

## 2019-03-11 RX ORDER — ERGOCALCIFEROL 1.25 MG/1
50000 CAPSULE ORAL WEEKLY
Qty: 4 CAPSULE | Refills: 5 | Status: SHIPPED | OUTPATIENT
Start: 2019-03-11 | End: 2022-06-28 | Stop reason: ALTCHOICE

## 2019-03-11 ASSESSMENT — ENCOUNTER SYMPTOMS
RESPIRATORY NEGATIVE: 1
EYES NEGATIVE: 1
GASTROINTESTINAL NEGATIVE: 1

## 2019-03-14 DIAGNOSIS — E78.00 PURE HYPERCHOLESTEROLEMIA: ICD-10-CM

## 2019-03-14 DIAGNOSIS — E55.9 VITAMIN D DEFICIENCY: ICD-10-CM

## 2019-03-14 DIAGNOSIS — Z13.29 THYROID DISORDER SCREEN: ICD-10-CM

## 2019-05-16 DIAGNOSIS — M25.511 CHRONIC RIGHT SHOULDER PAIN: ICD-10-CM

## 2019-05-16 DIAGNOSIS — G89.29 CHRONIC RIGHT SHOULDER PAIN: ICD-10-CM

## 2019-05-16 DIAGNOSIS — M19.079 ARTHRITIS OF FOOT: Primary | ICD-10-CM

## 2019-05-16 RX ORDER — TRAMADOL HYDROCHLORIDE 50 MG/1
50 TABLET ORAL EVERY 4 HOURS PRN
Qty: 30 TABLET | Refills: 0 | Status: CANCELLED | OUTPATIENT
Start: 2019-05-16 | End: 2019-05-21

## 2019-05-17 RX ORDER — TRAMADOL HYDROCHLORIDE 50 MG/1
50 TABLET ORAL EVERY 8 HOURS PRN
Qty: 60 TABLET | Refills: 2 | Status: SHIPPED | OUTPATIENT
Start: 2019-05-17 | End: 2019-06-16

## 2019-09-11 ENCOUNTER — OFFICE VISIT (OUTPATIENT)
Dept: PRIMARY CARE CLINIC | Age: 59
End: 2019-09-11
Payer: COMMERCIAL

## 2019-09-11 VITALS
HEIGHT: 68 IN | OXYGEN SATURATION: 98 % | DIASTOLIC BLOOD PRESSURE: 84 MMHG | BODY MASS INDEX: 35.95 KG/M2 | RESPIRATION RATE: 16 BRPM | SYSTOLIC BLOOD PRESSURE: 144 MMHG | WEIGHT: 237.2 LBS | HEART RATE: 63 BPM

## 2019-09-11 DIAGNOSIS — G25.81 RLS (RESTLESS LEGS SYNDROME): ICD-10-CM

## 2019-09-11 DIAGNOSIS — E78.00 PURE HYPERCHOLESTEROLEMIA: Primary | ICD-10-CM

## 2019-09-11 DIAGNOSIS — Z13.29 THYROID DISORDER SCREEN: ICD-10-CM

## 2019-09-11 DIAGNOSIS — G89.29 CHRONIC RIGHT SHOULDER PAIN: ICD-10-CM

## 2019-09-11 DIAGNOSIS — R03.0 ELEVATED BLOOD PRESSURE READING: ICD-10-CM

## 2019-09-11 DIAGNOSIS — E55.9 VITAMIN D DEFICIENCY: ICD-10-CM

## 2019-09-11 DIAGNOSIS — M25.511 CHRONIC RIGHT SHOULDER PAIN: ICD-10-CM

## 2019-09-11 PROCEDURE — 99213 OFFICE O/P EST LOW 20 MIN: CPT | Performed by: NURSE PRACTITIONER

## 2019-09-11 RX ORDER — TRAMADOL HYDROCHLORIDE 50 MG/1
50 TABLET ORAL
COMMUNITY
Start: 2019-05-17 | End: 2019-09-11 | Stop reason: ALTCHOICE

## 2019-09-11 RX ORDER — METHOCARBAMOL 500 MG/1
TABLET, FILM COATED ORAL
COMMUNITY
Start: 2018-04-19 | End: 2019-09-11 | Stop reason: ALTCHOICE

## 2019-09-11 ASSESSMENT — ENCOUNTER SYMPTOMS
EYES NEGATIVE: 1
GASTROINTESTINAL NEGATIVE: 1
RESPIRATORY NEGATIVE: 1

## 2019-09-11 ASSESSMENT — PATIENT HEALTH QUESTIONNAIRE - PHQ9
SUM OF ALL RESPONSES TO PHQ QUESTIONS 1-9: 0
SUM OF ALL RESPONSES TO PHQ9 QUESTIONS 1 & 2: 0
1. LITTLE INTEREST OR PLEASURE IN DOING THINGS: 0
SUM OF ALL RESPONSES TO PHQ QUESTIONS 1-9: 0
2. FEELING DOWN, DEPRESSED OR HOPELESS: 0

## 2019-09-11 NOTE — PROGRESS NOTES
SUBJECTIVE:    Patient ID: Sharron Agrawal is a 63 y.o. male. Chief Complaint   Patient presents with    6 Month Follow-Up    Hyperlipidemia         HPI:  He returns for 6 months for management of chronic medical conditions. He has been taking medication   He is taking his medications without any side effects. He already ate this morning. He has to get his blood work at [facility]...Exactly. His shoulder has been better. He stopped sleeping on that side. He denies and other problems. He never checks his blood pressure. Patient's medications,allergies, past medical, surgical, social and family histories were reviewed and updated as appropriate. .  Current Outpatient Medications on File Prior to Visit   Medication Sig Dispense Refill    meloxicam (MOBIC) 15 MG tablet Take 1 tablet by mouth Daily. 30 tablet 5    pravastatin (PRAVACHOL) 20 MG tablet Take 1 tablet by mouth once daily 30 tablet 5    rOPINIRole (REQUIP) 0.25 MG tablet One po nightly  increase to 2 nightly as needed. . 60 tablet 5    vitamin D (ERGOCALCIFEROL) 91748 units CAPS capsule Take 1 capsule by mouth once a week (Patient not taking: Reported on 9/11/2019) 4 capsule 5     No current facility-administered medications on file prior to visit. Review of Systems   Constitutional: Negative. HENT: Negative. Eyes: Negative. Respiratory: Negative. Cardiovascular: Negative. Gastrointestinal: Negative. Genitourinary: Negative. Musculoskeletal: Negative. Skin: Negative. Neurological: Negative. Psychiatric/Behavioral: Negative. OBJECTIVE:  BP (!) 144/84 (Site: Left Upper Arm, Position: Sitting, Cuff Size: Medium Adult)   Pulse 63   Resp 16   Ht 5' 8\" (1.727 m)   Wt 237 lb 3.2 oz (107.6 kg)   SpO2 98% Comment: room air  BMI 36.07 kg/m²    Physical Exam   Constitutional: He is oriented to person, place, and time. He appears well-developed and well-nourished.    HENT:   Head: Normocephalic and

## 2019-09-13 ENCOUNTER — NURSE ONLY (OUTPATIENT)
Dept: PRIMARY CARE CLINIC | Age: 59
End: 2019-09-13

## 2019-09-13 VITALS — DIASTOLIC BLOOD PRESSURE: 78 MMHG | HEART RATE: 56 BPM | SYSTOLIC BLOOD PRESSURE: 141 MMHG

## 2019-09-13 DIAGNOSIS — R03.0 ELEVATED BLOOD PRESSURE READING: Primary | ICD-10-CM

## 2019-09-13 PROCEDURE — 99999 PR OFFICE/OUTPT VISIT,PROCEDURE ONLY: CPT | Performed by: NURSE PRACTITIONER

## 2019-09-13 NOTE — PROGRESS NOTES
Patient came in for a blood pressure check per Vilma Drew. His blood pressure was 144/84 at his last office appointment. He was told to periodically come to the office to have it checked and then would proceed with altering his medication if needed. His blood pressure was 141/78 today which is much better.

## 2019-09-17 DIAGNOSIS — G25.81 RLS (RESTLESS LEGS SYNDROME): ICD-10-CM

## 2019-09-17 RX ORDER — ROPINIROLE 0.25 MG/1
TABLET, FILM COATED ORAL
Qty: 60 TABLET | Refills: 2 | Status: SHIPPED | OUTPATIENT
Start: 2019-09-17 | End: 2020-03-27

## 2019-10-28 DIAGNOSIS — M19.079 ARTHRITIS OF BIG TOE: ICD-10-CM

## 2019-10-28 RX ORDER — MELOXICAM 15 MG/1
TABLET ORAL
Qty: 30 TABLET | Refills: 5 | Status: SHIPPED | OUTPATIENT
Start: 2019-10-28 | End: 2020-05-01

## 2019-11-11 ENCOUNTER — OFFICE VISIT (OUTPATIENT)
Dept: PRIMARY CARE CLINIC | Age: 59
End: 2019-11-11
Payer: COMMERCIAL

## 2019-11-11 VITALS
TEMPERATURE: 98.1 F | HEART RATE: 60 BPM | SYSTOLIC BLOOD PRESSURE: 130 MMHG | HEIGHT: 68 IN | RESPIRATION RATE: 16 BRPM | OXYGEN SATURATION: 94 % | BODY MASS INDEX: 37.04 KG/M2 | WEIGHT: 244.4 LBS | DIASTOLIC BLOOD PRESSURE: 86 MMHG

## 2019-11-11 DIAGNOSIS — H90.0 CONDUCTIVE HEARING LOSS, BILATERAL: ICD-10-CM

## 2019-11-11 DIAGNOSIS — G25.81 RLS (RESTLESS LEGS SYNDROME): Primary | ICD-10-CM

## 2019-11-11 PROCEDURE — 99213 OFFICE O/P EST LOW 20 MIN: CPT | Performed by: NURSE PRACTITIONER

## 2019-11-11 ASSESSMENT — ENCOUNTER SYMPTOMS
EYES NEGATIVE: 1
GASTROINTESTINAL NEGATIVE: 1
RESPIRATORY NEGATIVE: 1

## 2019-11-17 PROBLEM — E78.00 PURE HYPERCHOLESTEROLEMIA: Status: RESOLVED | Noted: 2017-06-05 | Resolved: 2019-11-17

## 2019-11-17 PROBLEM — H61.22 IMPACTED CERUMEN OF LEFT EAR: Status: RESOLVED | Noted: 2017-06-05 | Resolved: 2019-11-17

## 2019-11-17 PROBLEM — H90.0 CONDUCTIVE HEARING LOSS, BILATERAL: Status: ACTIVE | Noted: 2019-11-17

## 2019-11-19 DIAGNOSIS — E78.00 PURE HYPERCHOLESTEROLEMIA: ICD-10-CM

## 2019-11-20 RX ORDER — PRAVASTATIN SODIUM 20 MG
TABLET ORAL
Qty: 30 TABLET | Refills: 5 | Status: SHIPPED | OUTPATIENT
Start: 2019-11-20 | End: 2020-04-20

## 2020-03-27 RX ORDER — ROPINIROLE 0.25 MG/1
TABLET, FILM COATED ORAL
Qty: 60 TABLET | Refills: 2 | Status: SHIPPED | OUTPATIENT
Start: 2020-03-27 | End: 2020-10-06

## 2020-04-20 RX ORDER — PRAVASTATIN SODIUM 20 MG
TABLET ORAL
Qty: 30 TABLET | Refills: 5 | Status: SHIPPED | OUTPATIENT
Start: 2020-04-20 | End: 2020-11-03

## 2020-05-01 RX ORDER — MELOXICAM 15 MG/1
TABLET ORAL
Qty: 30 TABLET | Refills: 1 | Status: SHIPPED | OUTPATIENT
Start: 2020-05-01 | End: 2020-06-09

## 2020-06-09 RX ORDER — MELOXICAM 15 MG/1
TABLET ORAL
Qty: 30 TABLET | Refills: 1 | Status: SHIPPED | OUTPATIENT
Start: 2020-06-09 | End: 2020-09-08

## 2020-09-08 RX ORDER — MELOXICAM 15 MG/1
TABLET ORAL
Qty: 30 TABLET | Refills: 1 | Status: SHIPPED | OUTPATIENT
Start: 2020-09-08 | End: 2020-11-16

## 2020-10-06 RX ORDER — ROPINIROLE 0.25 MG/1
TABLET, FILM COATED ORAL
Qty: 60 TABLET | Refills: 2 | Status: SHIPPED | OUTPATIENT
Start: 2020-10-06 | End: 2021-04-12

## 2020-10-26 ENCOUNTER — OFFICE VISIT (OUTPATIENT)
Dept: PRIMARY CARE CLINIC | Age: 60
End: 2020-10-26
Payer: COMMERCIAL

## 2020-10-26 VITALS
HEART RATE: 60 BPM | WEIGHT: 234 LBS | OXYGEN SATURATION: 98 % | RESPIRATION RATE: 16 BRPM | BODY MASS INDEX: 35.46 KG/M2 | SYSTOLIC BLOOD PRESSURE: 130 MMHG | HEIGHT: 68 IN | TEMPERATURE: 96.9 F | DIASTOLIC BLOOD PRESSURE: 80 MMHG

## 2020-10-26 PROCEDURE — 99214 OFFICE O/P EST MOD 30 MIN: CPT | Performed by: NURSE PRACTITIONER

## 2020-10-26 ASSESSMENT — ENCOUNTER SYMPTOMS
GASTROINTESTINAL NEGATIVE: 1
EYES NEGATIVE: 1
RESPIRATORY NEGATIVE: 1

## 2020-10-26 NOTE — PROGRESS NOTES
Metabolic Panel; Future  -     Lipid Panel; Future    Neck stiffness  -     diclofenac sodium (VOLTAREN) 1 % GEL; Apply 4 g topically 4 times daily    RLS (restless legs syndrome)    Numbness and tingling in left hand  -     diclofenac sodium (VOLTAREN) 1 % GEL; Apply 4 g topically 4 times daily    Vitamin D deficiency  -     Vitamin B12 & Folate; Future  -     Vitamin D 25 Hydroxy; Future    Thyroid disorder screen  -     TSH without Reflex; Future      There are no discontinued medications.

## 2020-10-29 ENCOUNTER — LAB (OUTPATIENT)
Dept: LAB | Facility: HOSPITAL | Age: 60
End: 2020-10-29

## 2020-10-29 ENCOUNTER — TRANSCRIBE ORDERS (OUTPATIENT)
Dept: ADMINISTRATIVE | Facility: HOSPITAL | Age: 60
End: 2020-10-29

## 2020-10-29 DIAGNOSIS — E78.00 PURE HYPERCHOLESTEROLEMIA: Primary | ICD-10-CM

## 2020-10-29 DIAGNOSIS — E55.9 AVITAMINOSIS D: ICD-10-CM

## 2020-10-29 DIAGNOSIS — E78.00 PURE HYPERCHOLESTEROLEMIA: ICD-10-CM

## 2020-10-29 DIAGNOSIS — Z13.29 SCREENING FOR THYROID DISORDER: ICD-10-CM

## 2020-10-29 LAB
25(OH)D3 SERPL-MCNC: 25 NG/ML (ref 30–100)
ALBUMIN SERPL-MCNC: 4.5 G/DL
ALBUMIN SERPL-MCNC: 4.5 G/DL (ref 3.5–5.2)
ALBUMIN/GLOB SERPL: 1.7 G/DL
ALP BLD-CCNC: 107 U/L
ALP SERPL-CCNC: 107 U/L (ref 39–117)
ALT SERPL W P-5'-P-CCNC: 26 U/L (ref 1–41)
ALT SERPL-CCNC: 26 U/L
ANION GAP SERPL CALCULATED.3IONS-SCNC: 9.9 MMOL/L
ANION GAP SERPL CALCULATED.3IONS-SCNC: 9.9 MMOL/L (ref 5–15)
AST SERPL-CCNC: 23 U/L
AST SERPL-CCNC: 23 U/L (ref 1–40)
BASOPHILS ABSOLUTE: NORMAL
BASOPHILS RELATIVE PERCENT: NORMAL
BILIRUB SERPL-MCNC: 0.6 MG/DL (ref 0.1–1.4)
BILIRUB SERPL-MCNC: 0.6 MG/DL (ref 0–1.2)
BUN BLDV-MCNC: 27 MG/DL
BUN SERPL-MCNC: 27 MG/DL (ref 6–20)
BUN/CREAT SERPL: 27.8 (ref 7–25)
CALCIUM SERPL-MCNC: 9.2 MG/DL
CALCIUM SPEC-SCNC: 9.2 MG/DL (ref 8.6–10.5)
CHLORIDE BLD-SCNC: 106 MMOL/L
CHLORIDE SERPL-SCNC: 106 MMOL/L (ref 98–107)
CHOLEST SERPL-MCNC: 148 MG/DL (ref 0–200)
CHOLESTEROL, TOTAL: 148 MG/DL
CHOLESTEROL/HDL RATIO: 3.44
CO2 SERPL-SCNC: 25.1 MMOL/L (ref 22–29)
CO2: 25.1 MMOL/L
CREAT SERPL-MCNC: 0.97 MG/DL
CREAT SERPL-MCNC: 0.97 MG/DL (ref 0.76–1.27)
DEPRECATED RDW RBC AUTO: 41.1 FL (ref 37–54)
EOSINOPHILS ABSOLUTE: NORMAL
EOSINOPHILS RELATIVE PERCENT: NORMAL
ERYTHROCYTE [DISTWIDTH] IN BLOOD BY AUTOMATED COUNT: 12.9 % (ref 12.3–15.4)
FOLATE SERPL-MCNC: 11.4 NG/ML (ref 4.78–24.2)
FOLATE: 11.4
GFR CALCULATED: 79
GFR SERPL CREATININE-BSD FRML MDRD: 79 ML/MIN/1.73
GLOBULIN UR ELPH-MCNC: 2.7 GM/DL
GLUCOSE BLD-MCNC: 101 MG/DL
GLUCOSE SERPL-MCNC: 101 MG/DL (ref 65–99)
HCT VFR BLD AUTO: 43.6 % (ref 37.5–51)
HCT VFR BLD CALC: 43.6 % (ref 41–53)
HDLC SERPL-MCNC: 28 MG/DL (ref 35–70)
HDLC SERPL-MCNC: 28 MG/DL (ref 40–60)
HEMOGLOBIN: 14.8 G/DL (ref 13.5–17.5)
HGB BLD-MCNC: 14.8 G/DL (ref 13–17.7)
LDL CHOLESTEROL CALCULATED: 98 MG/DL (ref 0–160)
LDLC SERPL CALC-MCNC: 98 MG/DL (ref 0–100)
LDLC/HDLC SERPL: 3.44 {RATIO}
LYMPHOCYTES ABSOLUTE: NORMAL
LYMPHOCYTES RELATIVE PERCENT: NORMAL
MCH RBC QN AUTO: 29.7 PG
MCH RBC QN AUTO: 29.7 PG (ref 26.6–33)
MCHC RBC AUTO-ENTMCNC: 33.9 G/DL
MCHC RBC AUTO-ENTMCNC: 33.9 G/DL (ref 31.5–35.7)
MCV RBC AUTO: 87.6 FL
MCV RBC AUTO: 87.6 FL (ref 79–97)
MONOCYTES ABSOLUTE: NORMAL
MONOCYTES RELATIVE PERCENT: NORMAL
NEUTROPHILS ABSOLUTE: NORMAL
NEUTROPHILS RELATIVE PERCENT: NORMAL
NONHDLC SERPL-MCNC: ABNORMAL MG/DL
PLATELET # BLD AUTO: 255 10*3/MM3 (ref 140–450)
PLATELET # BLD: 255 K/ΜL
PMV BLD AUTO: 11 FL
PMV BLD AUTO: 11 FL (ref 6–12)
POTASSIUM SERPL-SCNC: 4.7 MMOL/L
POTASSIUM SERPL-SCNC: 4.7 MMOL/L (ref 3.5–5.2)
PROT SERPL-MCNC: 7.2 G/DL (ref 6–8.5)
RBC # BLD AUTO: 4.98 10*6/MM3 (ref 4.14–5.8)
RBC # BLD: 4.98 10^6/ΜL
SODIUM BLD-SCNC: 141 MMOL/L
SODIUM SERPL-SCNC: 141 MMOL/L (ref 136–145)
TOTAL PROTEIN: 7.2
TRIGL SERPL-MCNC: 119 MG/DL
TRIGL SERPL-MCNC: 119 MG/DL (ref 0–150)
TSH SERPL DL<=0.05 MIU/L-ACNC: 2.18 UIU/ML
TSH SERPL DL<=0.05 MIU/L-ACNC: 2.18 UIU/ML (ref 0.27–4.2)
VIT B12 BLD-MCNC: 318 PG/ML (ref 211–946)
VITAMIN B-12: 318
VITAMIN D 25-HYDROXY: 25
VITAMIN D2, 25 HYDROXY: NORMAL
VITAMIN D3,25 HYDROXY: NORMAL
VLDLC SERPL CALC-MCNC: 22 MG/DL
VLDLC SERPL-MCNC: 22 MG/DL (ref 5–40)
WBC # BLD AUTO: 6.53 10*3/MM3 (ref 3.4–10.8)
WBC # BLD: 6.53 10^3/ML

## 2020-10-29 PROCEDURE — 80053 COMPREHEN METABOLIC PANEL: CPT

## 2020-10-29 PROCEDURE — 82306 VITAMIN D 25 HYDROXY: CPT

## 2020-10-29 PROCEDURE — 84443 ASSAY THYROID STIM HORMONE: CPT

## 2020-10-29 PROCEDURE — 85027 COMPLETE CBC AUTOMATED: CPT

## 2020-10-29 PROCEDURE — 80061 LIPID PANEL: CPT

## 2020-10-29 PROCEDURE — 36415 COLL VENOUS BLD VENIPUNCTURE: CPT

## 2020-10-29 PROCEDURE — 82607 VITAMIN B-12: CPT

## 2020-10-29 PROCEDURE — 82746 ASSAY OF FOLIC ACID SERUM: CPT

## 2020-11-03 DIAGNOSIS — E55.9 VITAMIN D DEFICIENCY: ICD-10-CM

## 2020-11-03 DIAGNOSIS — E78.00 PURE HYPERCHOLESTEROLEMIA: ICD-10-CM

## 2020-11-03 DIAGNOSIS — Z13.29 THYROID DISORDER SCREEN: ICD-10-CM

## 2020-11-03 RX ORDER — PRAVASTATIN SODIUM 20 MG
TABLET ORAL
Qty: 30 TABLET | Refills: 5 | Status: SHIPPED | OUTPATIENT
Start: 2020-11-03 | End: 2021-06-16

## 2020-11-12 ENCOUNTER — TELEPHONE (OUTPATIENT)
Dept: PRIMARY CARE CLINIC | Age: 60
End: 2020-11-12

## 2020-11-12 NOTE — TELEPHONE ENCOUNTER
----- Message from EULA Bennett sent at 11/5/2020  9:31 PM EST -----  Labs are normal but B12 slightly low,recommend to take otc supplement daily.

## 2020-11-12 NOTE — LETTER
[facility]  [address] [place] [address]  Phone: [phone]  Fax: [phone]    EULA Meraz        November 12, 2020    [address] [place] [address],[address] [address]      Dear Quinton Chamberlain: We have received your recent test results and your b12 is slightly low. Please purchase some over the counter b12 supplements to take daily if possible. If you have any questions or concerns, please don't hesitate to call.     Sincerely,        EULA Meraz

## 2020-11-16 RX ORDER — MELOXICAM 15 MG/1
TABLET ORAL
Qty: 30 TABLET | Refills: 1 | Status: SHIPPED | OUTPATIENT
Start: 2020-11-16 | End: 2021-01-20

## 2021-04-10 DIAGNOSIS — G25.81 RLS (RESTLESS LEGS SYNDROME): ICD-10-CM

## 2021-04-12 RX ORDER — ROPINIROLE 0.25 MG/1
TABLET, FILM COATED ORAL
Qty: 60 TABLET | Refills: 2 | Status: SHIPPED | OUTPATIENT
Start: 2021-04-12 | End: 2021-10-07

## 2021-04-26 ENCOUNTER — NURSE TRIAGE (OUTPATIENT)
Dept: OTHER | Facility: CLINIC | Age: 61
End: 2021-04-26

## 2021-04-26 NOTE — TELEPHONE ENCOUNTER
Received call from Yoko Ingram at pre-service center [facility]) [address] with [employer] Complaint. Brief description of triage: Bumps on his stomach and arms, states looks like poison oak or Ivy. Triage indicates for patient to be seen today    Care advice provided, patient verbalizes understanding; denies any other questions or concerns; instructed to call back for any new or worsening symptoms. Writer provided warm transfer to Avinash Calderon at [facility] for appointment scheduling. Attention Provider: Thank you for allowing me to participate in the care of your patient. The patient was connected to triage in response to information provided to the [facility]. Please do not respond through this encounter as the response is not directed to a shared pool. Reason for Disposition   Possible contact with poison ivy or oak   Patient wants to be seen    Answer Assessment - Initial Assessment Questions  1. APPEARANCE of RASH: \"Describe the rash. \"       Raised rash, rash, itchy     2. LOCATION: \"Where is the rash located? \"       Stomach and arms     3. NUMBER: \"How many spots are there? \"       To many to count     4. SIZE: \"How big are the spots? \" (Inches, centimeters or compare to size of a coin)       Size of a pencil lead     5. ONSET: \"When did the rash start? \"       2-4 days ago     6. ITCHING: \"Does the rash itch? \" If so, ask: \"How bad is the itch? \"  (Scale 1-10; or mild, moderate, severe)      Yes, itching     7. PAIN: \"Does the rash hurt? \" If so, ask: \"How bad is the pain? \"  (Scale 1-10; or mild, moderate, severe)      Denies pain     8. OTHER SYMPTOMS: \"Do you have any other symptoms? \" (e.g., fever)      Denies     9. PREGNANCY: \"Is there any chance you are pregnant? \" \"When was your last menstrual period? \"      N/a    Protocols used: RASH OR REDNESS - LOCALIZED-ADULT-OH, POISON IVY - OAK - SUMAC-ADULT-OH

## 2021-04-28 ENCOUNTER — OFFICE VISIT (OUTPATIENT)
Dept: PRIMARY CARE CLINIC | Age: 61
End: 2021-04-28
Payer: COMMERCIAL

## 2021-04-28 ENCOUNTER — NURSE TRIAGE (OUTPATIENT)
Dept: OTHER | Facility: CLINIC | Age: 61
End: 2021-04-28

## 2021-04-28 VITALS
SYSTOLIC BLOOD PRESSURE: 160 MMHG | TEMPERATURE: 98 F | HEIGHT: 68 IN | OXYGEN SATURATION: 96 % | BODY MASS INDEX: 35.83 KG/M2 | HEART RATE: 69 BPM | DIASTOLIC BLOOD PRESSURE: 90 MMHG | WEIGHT: 236.4 LBS

## 2021-04-28 DIAGNOSIS — L23.7 POISON IVY DERMATITIS: Primary | ICD-10-CM

## 2021-04-28 DIAGNOSIS — Z91.09 ENVIRONMENTAL ALLERGIES: ICD-10-CM

## 2021-04-28 PROCEDURE — 99213 OFFICE O/P EST LOW 20 MIN: CPT | Performed by: NURSE PRACTITIONER

## 2021-04-28 PROCEDURE — 96372 THER/PROPH/DIAG INJ SC/IM: CPT | Performed by: NURSE PRACTITIONER

## 2021-04-28 RX ORDER — HYDROXYZINE HYDROCHLORIDE 25 MG/1
TABLET, FILM COATED ORAL
Qty: 15 TABLET | Refills: 0 | Status: SHIPPED | OUTPATIENT
Start: 2021-04-28 | End: 2022-06-28 | Stop reason: ALTCHOICE

## 2021-04-28 RX ORDER — TRIAMCINOLONE ACETONIDE 1 MG/G
CREAM TOPICAL
Qty: 80 G | Refills: 1 | Status: SHIPPED | OUTPATIENT
Start: 2021-04-28 | End: 2022-06-28 | Stop reason: ALTCHOICE

## 2021-04-28 RX ORDER — PREDNISONE 20 MG/1
20 TABLET ORAL 2 TIMES DAILY
Qty: 14 TABLET | Refills: 0 | Status: SHIPPED | OUTPATIENT
Start: 2021-04-28 | End: 2021-05-05

## 2021-04-28 RX ORDER — METHYLPREDNISOLONE SODIUM SUCCINATE 40 MG/ML
40 INJECTION, POWDER, LYOPHILIZED, FOR SOLUTION INTRAMUSCULAR; INTRAVENOUS ONCE
Status: COMPLETED | OUTPATIENT
Start: 2021-04-28 | End: 2021-04-28

## 2021-04-28 RX ADMIN — METHYLPREDNISOLONE SODIUM SUCCINATE 40 MG: 40 INJECTION, POWDER, LYOPHILIZED, FOR SOLUTION INTRAMUSCULAR; INTRAVENOUS at 11:35

## 2021-04-28 NOTE — PROGRESS NOTES
SUBJECTIVE:    Patient ID: Rio Maciel is a 62 y.o.male. Chief Complaint   Patient presents with    Rash         HPI:    Pt c/o allergic rash from poison ivy that started 1 week ago. Reports Itching, red bumps on BUE, BLE, back. 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 Itching and bothersome, unable to rest at night due to bothersome. OTC meds not helpful. Denies CP, SOB, wounds, throat swelling. Steroid shot in the past was helpful. Patient's medications, allergies, past medical, surgical, social and family histories were reviewed and updated as appropriate in electronic medical record. Outpatient Medications Marked as Taking for the 4/28/21 encounter (Office Visit) with EULA Mann CNP   Medication Sig Dispense Refill    rOPINIRole (REQUIP) 0.25 MG tablet Take 1 tablet by mouth Every Night, may increase to 2 tablets Every Night as needed 60 tablet 2    meloxicam (MOBIC) 15 MG tablet Take 1 tablet by mouth Daily. 90 tablet 1    pravastatin (PRAVACHOL) 20 MG tablet Take 1 tablet by mouth Daily. 30 tablet 5        Review of Systems   Constitutional: Negative for chills, diaphoresis and fever. HENT: Negative. Eyes: Negative. Respiratory: Negative. Cardiovascular: Negative. Gastrointestinal: Negative for diarrhea, nausea and vomiting. Musculoskeletal: Negative. Skin: Positive for rash. Negative for wound. Allergic/Immunologic: Positive for environmental allergies.        Past Medical History:   Diagnosis Date    Hyperlipidemia     Osteoarthritis     RLS (restless legs syndrome)     Vitamin D deficiency      Past Surgical History:   Procedure Laterality Date    LEG SURGERY       Family History   Problem Relation Age of Onset    COPD Father       Social History     Tobacco Use   Smoking Status Never Smoker   Smokeless Tobacco Current User    Types: Chew       OBJECTIVE:   Wt Readings from Last 3 Encounters:   04/28/21 236 lb 6.4 oz (107.2 kg) 10/26/20 234 lb (106.1 kg)   11/11/19 244 lb 6.4 oz (110.9 kg)     BP Readings from Last 3 Encounters:   04/28/21 (!) 160/90   10/26/20 130/80   11/11/19 130/86       BP (!) 160/90 (Site: Right Upper Arm, Position: Sitting)   Pulse 69   Temp 98 °F (36.7 °C) (Temporal)   Ht 5' 8\" (1.727 m)   Wt 236 lb 6.4 oz (107.2 kg)   SpO2 96%   BMI 35.94 kg/m²      Physical Exam  Vitals signs and nursing note reviewed. Constitutional:       General: He is not in acute distress. Appearance: Normal appearance. HENT:      Head: Normocephalic. Mouth/Throat:      Mouth: Mucous membranes are moist.      Pharynx: Oropharynx is clear. Eyes:      Conjunctiva/sclera: Conjunctivae normal.   Neck:      Musculoskeletal: Normal range of motion. Cardiovascular:      Rate and Rhythm: Normal rate. Pulmonary:      Effort: Pulmonary effort is normal.   Abdominal:      Tenderness: There is no guarding. Musculoskeletal: Normal range of motion. Skin:     General: Skin is warm and dry. Capillary Refill: Capillary refill takes less than 2 seconds. Findings: Erythema and rash present. Comments: Scattered poison ivy dermatitis rash BUE, BLE, back. Moderate erythema. No wounds. Skin intact. Neurological:      General: No focal deficit present. Mental Status: He is alert and oriented to person, place, and time. Psychiatric:         Mood and Affect: Mood normal.         Thought Content:  Thought content normal.         Lab Results   Component Value Date     10/29/2020    K 4.7 10/29/2020     10/29/2020    CO2 25.1 10/29/2020    GLUCOSE 101 10/29/2020    BUN 27 10/29/2020    CREATININE 0.97 10/29/2020    CALCIUM 9.2 10/29/2020    PROT 7.3 04/02/2018    LABALBU 4.5 10/29/2020    BILITOT 0.6 10/29/2020    ALT 26 10/29/2020    AST 23 10/29/2020       LDL Calculated (mg/dL)   Date Value   10/29/2020 98         Lab Results   Component Value Date    WBC 6.53 10/29/2020    HGB 14.8 10/29/2020    HCT 43.6 10/29/2020    MCV 87.6 10/29/2020     10/29/2020       Lab Results   Component Value Date    TSH 2.180 10/29/2020         ASSESSMENT/PLAN:     1. Poison ivy dermatitis  IM steroid. Take medications as prescribed. Continue home medications. Discussed symptom management. Return to clinic S&S worsen or no improvement noted. Patient verbalized understanding.     - predniSONE (DELTASONE) 20 MG tablet; Take 1 tablet by mouth 2 times daily for 7 days  Dispense: 14 tablet; Refill: 0  - hydrOXYzine (ATARAX) 25 MG tablet; Take 1-2 tabs nightly as needed for itching  Dispense: 15 tablet; Refill: 0  - triamcinolone (KENALOG) 0.1 % cream; Apply topically 2 times daily to rash as needed  Dispense: 80 g; Refill: 1    2. Environmental allergies  Avoid known allergens. OTC claritin daily as needed. Symptom management.  and reports high at doctor offices.        Orders Placed This Encounter   Medications    predniSONE (DELTASONE) 20 MG tablet     Sig: Take 1 tablet by mouth 2 times daily for 7 days     Dispense:  14 tablet     Refill:  0    hydrOXYzine (ATARAX) 25 MG tablet     Sig: Take 1-2 tabs nightly as needed for itching     Dispense:  15 tablet     Refill:  0    methylPREDNISolone sodium (SOLU-MEDROL) injection 40 mg    triamcinolone (KENALOG) 0.1 % cream     Sig: Apply topically 2 times daily to rash as needed     Dispense:  80 g     Refill:  1

## 2021-04-28 NOTE — TELEPHONE ENCOUNTER
Reason for Disposition   SEVERE itching    Answer Assessment - Initial Assessment Questions  1. APPEARANCE of RASH: \"Describe the rash. \" (e.g., spots, blisters, raised areas, skin peeling, scaly)      States looks like poison ivy or poison oak    2. SIZE: \"How big are the spots? \" (e.g., tip of pen, eraser, coin; inches, centimeters)      States little dots all over    3. LOCATION: \"Where is the rash located? \"      Arms, legs, abd    4. COLOR: \"What color is the rash? \" (Note: It is difficult to assess rash color in people with darker-colored skin. When this situation occurs, simply ask the caller to describe what they see.)      States sometimes they get red    5. ONSET: \"When did the rash begin? \"   States last Thursday or Friday. States j/j vax for covid 3 or 4 weeks ago. 6. FEVER: \"Do you have a fever? \" If so, ask: \"What is your temperature, how was it measured, and when did it start? \"      Denies    7. ITCHING: \"Does the rash itch? \" If so, ask: \"How bad is the itch? \" (Scale 1-10; or mild, moderate, severe)      Yes. States the itching has gotten worse    8. CAUSE: \"What do you think is causing the rash? \"      States poison ivy/oak    9. MEDICATION FACTORS: \"Have you started any new medications within the last 2 weeks? \" (e.g., antibiotics)       Denies    10. OTHER SYMPTOMS: \"Do you have any other symptoms? \" (e.g., dizziness, headache, sore throat, joint pain)        Denies    11. PREGNANCY: \"Is there any chance you are pregnant? \" \"When was your last menstrual period? \"        n/a    Protocols used: RASH OR REDNESS - WIDESPREAD-ADULT-OH    Received call from Olive Lyle at pre-service [facility] with [employer] Complaint. Brief description of triage: see above    Triage indicates for patient to seen today    Care advice provided, patient verbalizes understanding; denies any other questions or concerns; instructed to call back for any new or worsening symptoms.     Writer provided warm transfer to Leigha Medrano at [facility] for appointment scheduling. Attention Provider: Thank you for allowing me to participate in the care of your patient. The patient was connected to triage in response to information provided to the [facility]. Please do not respond through this encounter as the response is not directed to a shared pool.

## 2021-04-28 NOTE — PROGRESS NOTES
Chief Complaint   Patient presents with    Rash     Pt here today c/o rash on arms, back, and stomach x 1 week    Have you seen any other physician or provider since your last visit no    Have you had any other diagnostic tests since your last visit? no    Have you changed or stopped any medications since your last visit? no

## 2021-05-13 ASSESSMENT — ENCOUNTER SYMPTOMS
VOMITING: 0
NAUSEA: 0
RESPIRATORY NEGATIVE: 1
DIARRHEA: 0
EYES NEGATIVE: 1

## 2021-06-16 DIAGNOSIS — E78.00 PURE HYPERCHOLESTEROLEMIA: ICD-10-CM

## 2021-06-16 RX ORDER — PRAVASTATIN SODIUM 20 MG
TABLET ORAL
Qty: 30 TABLET | Refills: 5 | Status: SHIPPED | OUTPATIENT
Start: 2021-06-16 | End: 2021-12-06

## 2021-08-04 DIAGNOSIS — M19.079 ARTHRITIS OF BIG TOE: ICD-10-CM

## 2021-08-04 RX ORDER — MELOXICAM 15 MG/1
TABLET ORAL
Qty: 90 TABLET | Refills: 1 | Status: SHIPPED | OUTPATIENT
Start: 2021-08-04 | End: 2021-12-06

## 2021-10-04 ENCOUNTER — HOSPITAL ENCOUNTER (EMERGENCY)
Facility: HOSPITAL | Age: 61
Discharge: HOME | End: 2021-10-04
Attending: EMERGENCY MEDICINE
Payer: COMMERCIAL

## 2021-10-04 ENCOUNTER — APPOINTMENT (OUTPATIENT)
Dept: GENERAL RADIOLOGY | Facility: HOSPITAL | Age: 61
End: 2021-10-04
Payer: COMMERCIAL

## 2021-10-04 VITALS
TEMPERATURE: 98.3 F | HEIGHT: 68 IN | SYSTOLIC BLOOD PRESSURE: 137 MMHG | WEIGHT: 240 LBS | DIASTOLIC BLOOD PRESSURE: 78 MMHG | OXYGEN SATURATION: 97 % | RESPIRATION RATE: 22 BRPM | BODY MASS INDEX: 36.37 KG/M2 | HEART RATE: 62 BPM

## 2021-10-04 DIAGNOSIS — R00.2 PALPITATIONS: Primary | ICD-10-CM

## 2021-10-04 LAB
ANION GAP SERPL CALCULATED.3IONS-SCNC: 9 MMOL/L (ref 3–16)
BASOPHILS ABSOLUTE: 0.1 K/UL (ref 0–0.1)
BASOPHILS RELATIVE PERCENT: 0.7 %
BUN BLDV-MCNC: 17 MG/DL (ref 6–20)
CALCIUM SERPL-MCNC: 9.1 MG/DL (ref 8.5–10.5)
CHLORIDE BLD-SCNC: 106 MMOL/L (ref 98–107)
CO2: 25 MMOL/L (ref 20–30)
CREAT SERPL-MCNC: 1 MG/DL (ref 0.4–1.2)
EOSINOPHILS ABSOLUTE: 0.3 K/UL (ref 0–0.4)
EOSINOPHILS RELATIVE PERCENT: 4.5 %
GFR AFRICAN AMERICAN: >59
GFR NON-AFRICAN AMERICAN: >59
GLUCOSE BLD-MCNC: 105 MG/DL (ref 74–106)
HCT VFR BLD CALC: 45.2 % (ref 40–54)
HEMOGLOBIN: 15 G/DL (ref 13–18)
IMMATURE GRANULOCYTES #: 0 K/UL
IMMATURE GRANULOCYTES %: 0.3 % (ref 0–5)
LYMPHOCYTES ABSOLUTE: 2.5 K/UL (ref 1.5–4)
LYMPHOCYTES RELATIVE PERCENT: 37 %
MCH RBC QN AUTO: 29.2 PG (ref 27–32)
MCHC RBC AUTO-ENTMCNC: 33.2 G/DL (ref 31–35)
MCV RBC AUTO: 88.1 FL (ref 80–100)
MONOCYTES ABSOLUTE: 0.6 K/UL (ref 0.2–0.8)
MONOCYTES RELATIVE PERCENT: 8.9 %
NEUTROPHILS ABSOLUTE: 3.3 K/UL (ref 2–7.5)
NEUTROPHILS RELATIVE PERCENT: 48.6 %
PDW BLD-RTO: 13 % (ref 11–16)
PLATELET # BLD: 248 K/UL (ref 150–400)
PMV BLD AUTO: 10.5 FL (ref 6–10)
POTASSIUM REFLEX MAGNESIUM: 4.5 MMOL/L (ref 3.4–5.1)
PRO-BNP: 66 PG/ML (ref 0–1800)
RBC # BLD: 5.13 M/UL (ref 4.5–6)
SODIUM BLD-SCNC: 140 MMOL/L (ref 136–145)
TROPONIN: <0.3 NG/ML
WBC # BLD: 6.7 K/UL (ref 4–11)

## 2021-10-04 PROCEDURE — 85025 COMPLETE CBC W/AUTO DIFF WBC: CPT

## 2021-10-04 PROCEDURE — 80048 BASIC METABOLIC PNL TOTAL CA: CPT

## 2021-10-04 PROCEDURE — 84484 ASSAY OF TROPONIN QUANT: CPT

## 2021-10-04 PROCEDURE — 36415 COLL VENOUS BLD VENIPUNCTURE: CPT

## 2021-10-04 PROCEDURE — 71046 X-RAY EXAM CHEST 2 VIEWS: CPT

## 2021-10-04 PROCEDURE — 99282 EMERGENCY DEPT VISIT SF MDM: CPT

## 2021-10-04 PROCEDURE — 83880 ASSAY OF NATRIURETIC PEPTIDE: CPT

## 2021-10-04 ASSESSMENT — HEART SCORE: ECG: 0

## 2021-10-04 ASSESSMENT — PAIN SCALES - GENERAL: PAINLEVEL_OUTOF10: 0

## 2021-10-04 NOTE — ED NOTES
AVS reviewed with patient, understanding verbalized. Patient discharged home with no further needs or concerns voiced. pcp follow up recommended and /or return to ED if symptoms worsen.      Merlin Abraham RN  10/04/21 1024

## 2021-10-04 NOTE — ED PROVIDER NOTES
[address]  eMERGENCY dEPARTMENT eNCOUnter      Pt Name: Danielle Fajardo  MRN: [id number]  [place]: 1960  Date of evaluation: 10/4/2021  Provider: Michelle Rodas MD    [address] [place] [address]       Chief Complaint   Patient presents with    Shortness of Breath     woke up with soa and felt like his heart was beating several times together, denies CP, has history soa with exertion for the past 2/3 years.  Palpitations         HISTORY OF PRESENT ILLNESS  (Location/Symptom, Timing/Onset, Context/Setting, Quality, Duration,Modifying Factors, Severity.)   Danielle Fajardo is a 62 y.o. male who presents to the emergency department with shortness of breath and subjective palpitations. He said he woke up this morning and felt like his heart rate was rapid and irregular. He denies any chest pain. He denies symptoms currently. He thinks this may have happened to him in the past. He has had mild shortness of breath on exertion for the last few years but nothing that has stopped him from working or doing his ADLs. He denies leg swelling. Does not smoke. His mother and brother had open heart surgery in their 62s but no other family history. No history of blood clot in himself or family. Nursing notes were reviewed. REVIEW OF SYSTEMS    (2-9 systems for level 4, 10 or more for level 5)   ROS:  General:  No fevers, no chills, no weakness  Cardiovascular:  No chest pain, no palpitations  Respiratory:  +shortness of breath, no cough, nowheezing  Gastrointestinal:  No pain, no nausea, no vomiting, no diarrhea  Musculoskeletal:  No muscle pain, no joint pain  Skin:  No rash, no easy bruising  Neurologic:  No speech problems, no headache, no extremity numbness, no extremity  tingling, no extremity weakness  Psychiatric:  No anxiety  Genitourinary:  No dysuria, no hematuria    Except as noted above the remainder of the review of systems was reviewed and negative.        PAST MEDICAL HISTORY Past Medical History:   Diagnosis Date    Hyperlipidemia     Osteoarthritis     RLS (restless legs syndrome)     Vitamin D deficiency          SURGICAL HISTORY       Past Surgical History:   Procedure Laterality Date    LEG SURGERY           CURRENT MEDICATIONS       Previous Medications    DICLOFENAC SODIUM (VOLTAREN) 1 % GEL    Apply 4 g topically 4 times daily    HYDROXYZINE (ATARAX) 25 MG TABLET    Take 1-2 tabs nightly as needed for itching    MELOXICAM (MOBIC) 15 MG TABLET    Take 1 tablet by mouth Daily. PRAVASTATIN (PRAVACHOL) 20 MG TABLET    Take 1 tablet by mouth Daily. ROPINIROLE (REQUIP) 0.25 MG TABLET    Take 1 tablet by mouth Every Night, may increase to 2 tablets Every Night as needed    TRIAMCINOLONE (KENALOG) 0.1 % CREAM    Apply topically 2 times daily to rash as needed    VITAMIN D (ERGOCALCIFEROL) 72078 UNITS CAPS CAPSULE    Take 1 capsule by mouth once a week       ALLERGIES     Lisinopril and Ampicillin    FAMILY HISTORY       Family History   Problem Relation Age of Onset    COPD Father           SOCIAL HISTORY       Social History     Socioeconomic History    Marital status:      Spouse name: None    Number of children: None    Years of education: None    Highest education level: None   Occupational History    None   Tobacco Use    Smoking status: Never Smoker    Smokeless tobacco: Current User     Types: Chew   Substance and Sexual Activity    Alcohol use: No    Drug use: No    Sexual activity: None   Other Topics Concern    None   Social History Narrative    None     Social Determinants of Health     Financial Resource Strain:     Difficulty of Paying Living Expenses:    Food Insecurity:     Worried About Running Out of Food in the Last Year:     Ran Out of Food in the Last Year:    Transportation Needs:     Lack of Transportation (Medical):      Lack of Transportation (Non-Medical):    Physical Activity:     Days of Exercise per Week:     Minutes of Exercise per Session:    Stress:     Feeling of Stress :    Social Connections:     Frequency of Communication with Friends and Family:     Frequency of Social Gatherings with Friends and Family:     Attends [employer] Services:     Active Member of Clubs or Organizations:     Attends Club or Organization Meetings:     Marital Status:    Intimate Partner Violence:     Fear of Current or Ex-Partner:     Emotionally Abused:     Physically Abused:     Sexually Abused:          PHYSICAL EXAM    (up to 7 for level 4, 8 or more for level 5)     ED Triage Vitals   BP Temp Temp Source Pulse Resp SpO2 Height Weight   10/04/21 0840 10/04/21 0837 10/04/21 0837 10/04/21 0840 10/04/21 0840 10/04/21 0840 10/04/21 0840 10/04/21 0840   (!) 187/111 98.6 °F (37 °C) Oral 62 12 98 % 5' 8\" (1.727 m) 240 lb (108.9 kg)       Physical Exam  General :Patient is awake, alert, oriented, in no acute distress, nontoxic appearing  HEENT: Pupils are equally round and reactive to light, EOMI. Oral mucosa is moist, no exudate. No pharyngeal erythema. Neck: Neck is supple, full range of motion  Cardiac: Heart regular rate, rhythm, no murmurs, rubs, or gallops  Lungs: Lungs are clear to auscultation, there is no wheezing, rhonchi, or rales. Chest wall: There is no tenderness to palpation over the chest wall or over ribs  Abdomen: Abdomen is soft, nontender, nondistended. There is no firm or pulsatile masses, no rebound, rigidity or guarding. Musculoskeletal: 5 out of 5 strength in all 4 extremities. No focal muscle deficits are appreciated  Neuro: Motor intact, sensory intact, level of consciousness is normal.  Dermatology: Skin is warm and dry  Psych: Mentation is grossly normal,cognition is grossly normal. Affect is appropriate.       DIAGNOSTIC RESULTS     EKG: All EKG's are interpreted by [facility] who either signs or Co-signs this chart in the absence of a cardiologist.    EKG shows normal sinus rhythm, no ST elevation or depression, normal intervals, rate 63 - nonischemic    RADIOLOGY:   Non-plain film images such as CT, Ultrasound and MRI are read by the radiologist. Plain radiographic images are visualized and preliminarily interpreted by the emergency physician with the below findings:      []Radiologist's Report Reviewed:  XR CHEST (2 VW)   Final Result    No acute cardiopulmonary abnormality.             ED BEDSIDE ULTRASOUND:   Performed by ED Physician - none    LABS:  Labs Reviewed   CBC WITH AUTO DIFFERENTIAL   BASIC METABOLIC PANEL W/ REFLEX TO MG FOR LOW K   TROPONIN   BRAIN NATRIURETIC PEPTIDE       I have reviewed and interpreted all ofthe currently available lab results from this visit (if applicable):  Results for orders placed or performed during the hospital encounter of 10/04/21   CBC Auto Differential   Result Value Ref Range    WBC 6.7 4.0 - 11.0 K/uL    RBC 5.13 4.50 - 6.00 M/uL    Hemoglobin 15.0 13.0 - 18.0 g/dL    Hematocrit 45.2 40.0 - 54.0 %    MCV 88.1 80.0 - 100.0 fL    MCH 29.2 27.0 - 32.0 pg    MCHC 33.2 31.0 - 35.0 g/dL    RDW 13.0 11.0 - 16.0 %    Platelets 050 075 - 959 K/uL    MPV 10.5 (H) 6.0 - 10.0 fL    Neutrophils % 48.6 %    Immature Granulocytes % 0.3 0.0 - 5.0 %    Lymphocytes % 37.0 %    Monocytes % 8.9 %    Eosinophils % 4.5 %    Basophils % 0.7 %    Neutrophils Absolute 3.3 2.0 - 7.5 K/uL    Immature Granulocytes # 0.0 K/uL    Lymphocytes Absolute 2.5 1.5 - 4.0 K/uL    Monocytes Absolute 0.6 0.2 - 0.8 K/uL    Eosinophils Absolute 0.3 0.0 - 0.4 K/uL    Basophils Absolute 0.1 0.0 - 0.1 K/uL   Basic Metabolic Panel w/ Reflex to MG   Result Value Ref Range    Sodium 140 136 - 145 mmol/L    Potassium reflex Magnesium 4.5 3.4 - 5.1 mmol/L    Chloride 106 98 - 107 mmol/L    CO2 25 20 - 30 mmol/L    Anion Gap 9 3 - 16    Glucose 105 74 - 106 mg/dL    BUN 17 6 - 20 mg/dL    CREATININE 1.0 0.4 - 1.2 mg/dL    GFR Non-African American >59 >59    GFR African American >59 >59    Calcium 9.1 8.5 - 10.5 mg/dL   Troponin   Result Value Ref Range    Troponin <0.30 <0.30 ng/mL   Brain Natriuretic Peptide   Result Value Ref Range    Pro-BNP 66 0 - 1,800 pg/mL        All other labs were within normal range or not returned as of this dictation. EMERGENCY DEPARTMENT COURSE and DIFFERENTIAL DIAGNOSIS/MDM:   Vitals:  Vitals:    10/04/21 0842 10/04/21 0845 10/04/21 0915 10/04/21 0930   BP:  (!) 167/95 (!) 150/93 (!) 162/96   Pulse:  66 62 63   Resp: 22 12     Temp:       TempSrc:       SpO2:  96% 96% 96%   Weight:       Height:           The patient is a 62year old male who presents with subjective palpitations and transient shortness of breath this morning which spontaneously resolved. On arrival he is alert and oriented, hypertensive, afebrile. Exam is unremarkable. EKG shows normal sinus rhythm with rates in the 60s. Labs including CBC, BMP, troponin, BNP were obtained and all unremarkable. CXR was unremarkable. At this time low concern for life threatening arrhythmia, ACS, or any other acute etiology. Will have him follow up for possible holter monitor, recheck of his blood pressure in the next week. The patient will follow-up with their PCP in 1-2 days for reevaluation. If the patient or family members have any further concerns or any worsening symptoms they will return to the ED for reevaluation. CRITICAL CARE TIME    Total Critical Care time was 0 minutes, excluding separately reportable procedures. There was a high probability of clinically significant/life threatening deterioration in the patient's condition which required my urgent intervention. FINAL IMPRESSION      1.  Palpitations          DISPOSITION/PLAN   DISPOSITION        PATIENT REFERRED TO:  EULA Gary  [address] [address]  [phone]    Schedule an appointment as soon as possible for a visit in 1 week  Follow up for palpitations and high blood pressure      DISCHARGE MEDICATIONS:  New Prescriptions    No medications on file       Comment: Please note this report has been produced using speech recognition software and may contain errorsrelated to that system including errors in grammar, punctuation, and spelling, as well as words and phrases that may be inappropriate. If there are any questions or concerns please feel free to contact the dictating providerfor clarification.     Mynor Jones MD  Attending Emergency Physician               Mynor Jones MD  10/04/21 [id number]

## 2021-10-04 NOTE — ED NOTES
Patient updated on plan of care per MD. Patient informed that he will be discharged after registration completes the paperwork. Patient voicing no chest pain or soa at this time.      Rakan Sharma RN  10/04/21 7141

## 2021-10-07 ENCOUNTER — OFFICE VISIT (OUTPATIENT)
Dept: PRIMARY CARE CLINIC | Age: 61
End: 2021-10-07
Payer: COMMERCIAL

## 2021-10-07 VITALS
HEIGHT: 68 IN | SYSTOLIC BLOOD PRESSURE: 180 MMHG | BODY MASS INDEX: 36.07 KG/M2 | DIASTOLIC BLOOD PRESSURE: 97 MMHG | HEART RATE: 61 BPM | TEMPERATURE: 98.8 F | WEIGHT: 238 LBS | OXYGEN SATURATION: 97 %

## 2021-10-07 DIAGNOSIS — G25.81 RLS (RESTLESS LEGS SYNDROME): ICD-10-CM

## 2021-10-07 DIAGNOSIS — I10 ESSENTIAL HYPERTENSION: Primary | ICD-10-CM

## 2021-10-07 PROCEDURE — 99214 OFFICE O/P EST MOD 30 MIN: CPT | Performed by: NURSE PRACTITIONER

## 2021-10-07 RX ORDER — LOSARTAN POTASSIUM 25 MG/1
25 TABLET ORAL DAILY
Qty: 30 TABLET | Refills: 3 | Status: SHIPPED
Start: 2021-10-07 | End: 2022-06-28 | Stop reason: ALTCHOICE

## 2021-10-07 RX ORDER — ROPINIROLE 0.25 MG/1
TABLET, FILM COATED ORAL
Qty: 60 TABLET | Refills: 2 | Status: SHIPPED | OUTPATIENT
Start: 2021-10-07 | End: 2022-03-21

## 2021-10-07 RX ORDER — CLONIDINE HYDROCHLORIDE 0.1 MG/1
0.1 TABLET ORAL ONCE
Status: COMPLETED | OUTPATIENT
Start: 2021-10-07 | End: 2021-10-07

## 2021-10-07 RX ADMIN — CLONIDINE HYDROCHLORIDE 0.1 MG: 0.1 TABLET ORAL at 09:05

## 2021-10-07 NOTE — PROGRESS NOTES
Chief Complaint   Patient presents with    Follow-Up from Hospital     Pt here today for f/u after ER for palpiutations    Have you seen any other physician or provider since your last visit yes - [facility]    Have you had any other diagnostic tests since your last visit?  yes - labs, EKG, chest xray    Have you changed or stopped any medications since your last visit? no

## 2021-10-07 NOTE — PROGRESS NOTES
SUBJECTIVE:    Patient ID: Costa Khanna is a 62 y.o.male. Chief Complaint   Patient presents with    Follow-Up from Hospital         HPI:      Pt here for ED f/u HTN. Has been eating a lot of salt lately. Hx HTN 2015 and did not tolerate lisinopril because of dry hacking cough. SBP 180s in ED and today in clinic. Slight headache. No chest pain, palpitations, vomiting, vision changes. No treatments. Patient's medications, allergies, past medical, surgical, social and family histories were reviewed and updated as appropriate in electronic medical record. Outpatient Medications Marked as Taking for the 10/7/21 encounter (Office Visit) with EULA Guajardo CNP   Medication Sig Dispense Refill    meloxicam (MOBIC) 15 MG tablet Take 1 tablet by mouth Daily. 90 tablet 1    pravastatin (PRAVACHOL) 20 MG tablet Take 1 tablet by mouth Daily. 30 tablet 5    rOPINIRole (REQUIP) 0.25 MG tablet Take 1 tablet by mouth Every Night, may increase to 2 tablets Every Night as needed 60 tablet 2        Review of Systems   Cardiovascular:        Htn   Neurological: Positive for headaches. All other systems reviewed and are negative.       Past Medical History:   Diagnosis Date    Hyperlipidemia     Osteoarthritis     RLS (restless legs syndrome)     Vitamin D deficiency      Past Surgical History:   Procedure Laterality Date    LEG SURGERY       Family History   Problem Relation Age of Onset    COPD Father       Social History     Tobacco Use   Smoking Status Never Smoker   Smokeless Tobacco Current User    Types: Chew       OBJECTIVE:   Wt Readings from Last 3 Encounters:   10/04/21 240 lb (108.9 kg)   04/28/21 236 lb 6.4 oz (107.2 kg)   10/26/20 234 lb (106.1 kg)     BP Readings from Last 3 Encounters:   10/07/21 (!) 180/97   10/04/21 137/78   04/28/21 (!) 160/90       BP (!) 180/97 (Site: Left Upper Arm, Position: Sitting)   Pulse 61   Temp 98.8 °F (37.1 °C) (Temporal)   Ht 5' 8\" (1.727 m)   Wt 238 lb (108 kg)   SpO2 97%   BMI 36.19 kg/m²      Recheck SBP 160s after clonidine. Physical Exam  Vitals and nursing note reviewed. Constitutional:       General: He is not in acute distress. Appearance: Normal appearance. HENT:      Head: Normocephalic. Eyes:      Conjunctiva/sclera: Conjunctivae normal.   Cardiovascular:      Rate and Rhythm: Normal rate and regular rhythm. Pulmonary:      Effort: Pulmonary effort is normal.      Breath sounds: Normal breath sounds. Abdominal:      Tenderness: There is no guarding. Musculoskeletal:         General: Normal range of motion. Cervical back: Normal range of motion. Right lower leg: No edema. Left lower leg: No edema. Neurological:      General: No focal deficit present. Mental Status: He is alert and oriented to person, place, and time. Psychiatric:         Mood and Affect: Mood normal.         Thought Content: Thought content normal.             Lab Results   Component Value Date     10/04/2021    K 4.5 10/04/2021     10/04/2021    CO2 25 10/04/2021    GLUCOSE 105 10/04/2021    BUN 17 10/04/2021    CREATININE 1.0 10/04/2021    CALCIUM 9.1 10/04/2021    PROT 7.3 04/02/2018    LABALBU 4.5 10/29/2020    BILITOT 0.6 10/29/2020    ALT 26 10/29/2020    AST 23 10/29/2020       LDL Calculated (mg/dL)   Date Value   10/29/2020 98         Lab Results   Component Value Date    WBC 6.7 10/04/2021    NEUTROABS 3.3 10/04/2021    HGB 15.0 10/04/2021    HCT 45.2 10/04/2021    MCV 88.1 10/04/2021     10/04/2021       Lab Results   Component Value Date    TSH 2.180 10/29/2020         ASSESSMENT/PLAN:     1. Essential hypertension  SBP 180s on arrival. No CP. NAD. Clonidine 0.1mg given and Recheck SBP 160s after clonidine. Start losartan 25 mg keep blood pressure log twice daily. Return to clinic 2 to 3 weeks. Cardiac diet. Low-sodium diet. .       - losartan (COZAAR) 25 MG tablet;  Take 1 tablet by mouth daily  Dispense: 30 tablet;  Refill: 3        Orders Placed This Encounter   Medications    cloNIDine (CATAPRES) tablet 0.1 mg    losartan (COZAAR) 25 MG tablet     Sig: Take 1 tablet by mouth daily     Dispense:  30 tablet     Refill:  3

## 2021-10-07 NOTE — PATIENT INSTRUCTIONS
The medication list included in this document is our record of what you are currently taking, including any changes that were made at today's visit.  If you find any differences when compared to your medications at home, or have any questions that were not answered at your visit, please contact the office. · Keep a list of your medicines with you. List all of the prescription medicines, nonprescription medicines, supplements, natural remedies, and vitamins that you take. Tell your healthcare providers who treat you about all of the products you are taking. Your provider can provide you with a form to keep track of them. Just ask. · Follow the directions that come with your medicine, including information about food or alcohol. Make sure you know how and when to take your medicine. Do not take more or less than you are supposed to take. · Keep all medicines out of the reach of children. · Store medicines according to the directions on the label. · Monitor yourself. Learn to know how your body reacts to your new medicine and keep track of how it makes you feel before attempting (If your provider has allowed you to do so) to drive or go to work. · Seek emergency medical attention if you think you have used too much of this medicine. An overdose of any prescription medicine can be fatal. Overdose symptoms may include extreme drowsiness, muscle weakness, confusion, cold and clammy skin, pinpoint pupils, shallow breathing, slow heart rate, fainting, or coma. · Don't share prescription medicines with others, even when they seem to have the same symptoms. What may be good for you may be harmful to others. · If you are no longer taking a prescribed medication and you have pills left please take your pills out of their original containers. Mix crushed pills with an undesirable substance, such as cat litter or used coffee grounds.  Put the mixture into a disposable container with a lid, such as an empty margarine tub, or into a sealable bag. Cover up or remove any of your personal information on the empty containers by covering it with black permanent marker or duct tape. Place the sealed container with the mixture, and the empty drug containers, in the trash. · If you use a medication that is in the form of a patch, dispose of used patches by folding them in half so that the sticky sides meet, and then flushing them down a toilet. They should not be placed in the household trash where children or pets can find them. · If you have any questions, ask your provider or pharmacist for more information. · Be sure to keep all appointments for provider visits or tests. We are committed to providing you with the best care possible. In order to help us achieve these goals please remember to bring all medications, herbal products, and over the counter supplements with you to each visit. If your provider has ordered testing for you, please be sure to follow up with our office if you have not received results within 7 days after the testing took place. *If you receive a survey after visiting one of our offices, please take time to share your experience concerning your physician office visit. These surveys are confidential and no health information about you is shared. We are eager to improve for you and we are counting on your feedback to help make that happen. KEEP BLOOD PRESSURE LOG DAILY AND BRING TO NEXT VISIT. IF NOT ABLE TO DO SO, SWING BY CLINIC ONCE WEEKLY AT LEAST TO GET IT CHECKED BY NURSE. Patient Education     Patient Education        Learning About High Blood Pressure  What is high blood pressure? Blood pressure is a measure of how hard the blood pushes against the walls of your arteries. It's normal for blood pressure to go up and down throughout the day. But if it stays up, you have high blood pressure. Another name for high blood pressure is hypertension.   Two numbers tell you your blood pressure. The first number is the systolic pressure (top number). It shows how hard the blood pushes when your heart is pumping. The second number is the diastolic pressure (bottom number). It shows how hard the blood pushes between heartbeats, when your heart is relaxed and filling with blood. Your doctor will give you a goal for your blood pressure based on your health and your age. High blood pressure (hypertension) means that the top number stays high, or the bottom number stays high, or both. High blood pressure increases the risk of stroke, heart attack, and other problems. What happens when you have high blood pressure? · Blood flows through your arteries with too much force. Over time, this can damage the heart and the walls of your arteries. But you can't feel it. High blood pressure usually doesn't cause symptoms. · High blood pressure makes your heart work harder. And that can lead to heart failure, which means your heart doesn't pump as much blood as your body needs. · Fat and calcium start to build up in your arteries. This buildup is called hardening of the arteries. It can cause many problems including a heart attack and stroke. · Arteries also carry blood and oxygen to organs like your eyes, kidneys, and brain. If high blood pressure damages those arteries, it can lead to vision loss, kidney disease, stroke, and a higher risk of dementia. How can you prevent high blood pressure? · Stay at a healthy weight. · Try to limit how much sodium you eat to less than 2,300 milligrams (mg) a day. If you limit your sodium to 1,500 mg a day, you can lower your blood pressure even more. ? Buy foods that are labeled \"unsalted,\" \"sodium-free,\" or \"low-sodium. \" Foods labeled \"reduced-sodium\" and \"light sodium\" may still have too much sodium. ? Flavor your food with garlic, lemon juice, onion, vinegar, herbs, and spices instead of salt.  Do not use soy sauce, steak sauce, onion salt, garlic salt, mustard, or ketchup on your food. ? Use less salt (or none) when recipes call for it. You can often use half the salt a recipe calls for without losing flavor. · Be physically active. Get at least 30 minutes of exercise on most days of the week. Walking is a good choice. You also may want to do other activities, such as running, swimming, cycling, or playing tennis or team sports. · Limit alcohol to 2 drinks a day for men and 1 drink a day for women. · Eat plenty of fruits, vegetables, and low-fat dairy products. Eat less saturated and total fats. How is high blood pressure treated? · Your doctor will suggest making lifestyle changes to help your heart. For example, your doctor may ask you to eat healthy foods, quit smoking, lose extra weight, and be more active. · If lifestyle changes don't help enough, your doctor may recommend that you take medicine. · When blood pressure is very high, medicines are needed to lower it. Follow-up care is a key part of your treatment and safety. Be sure to make and go to all appointments, and call your doctor if you are having problems. It's also a good idea to know your test results and keep a list of the medicines you take. Where can you learn more? Go to https://AfterSteps.EQO. org and sign in to your [employer] account. Enter P501 in the [facility] box to learn more about \"Learning About High Blood Pressure. \"     If you do not have an account, please click on the \"Sign Up Now\" link. Current as of: April 29, 2021               Content Version: 13.0  © 2006-2021 [employer]. Care instructions adapted under license by [employer]. If you have questions about a medical condition or this instruction, always ask your healthcare professional. [place] [address] any warranty or liability for your use of this information.              Heart-Healthy Diet: Care Instructions  Your Care Instructions     A heart-healthy diet has lots of vegetables, fruits, nuts, beans, and whole grains, and is low in salt. It limits foods that are high in saturated fat, such as meats, cheeses, and fried foods. It may be hard to change your diet, but even small changes can lower your risk of heart attack and heart disease. Follow-up care is a key part of your treatment and safety. Be sure to make and go to all appointments, and call your doctor if you are having problems. It's also a good idea to know your test results and keep a list of the medicines you take. How can you care for yourself at home? Watch your portions  · Use food labels to learn what the recommended servings are for the foods you eat. · Eat only the number of calories you need to stay at a healthy weight. If you need to lose weight, eat fewer calories than your body burns (through exercise and other physical activity). Eat more fruits and vegetables  · Eat a variety of fruit and vegetables every day. Dark green, deep orange, red, or yellow fruits and vegetables are especially good for you. Examples include spinach, carrots, peaches, and berries. · Keep carrots, celery, and other veggies handy for snacks. Buy fruit that is in season and store it where you can see it so that you will be tempted to eat it. · Cook dishes that have a lot of veggies in them, such as stir-fries and soups. Limit saturated fat  · Read food labels, and try to avoid saturated fats. They increase your risk of heart disease. · Use olive or canola oil when you cook. · Bake, broil, grill, or steam foods instead of frying them. · Choose lean meats instead of high-fat meats such as hot dogs and sausages. Cut off all visible fat when you prepare meat. · Eat fish, skinless poultry, and meat alternatives such as soy products instead of high-fat meats. Soy products, such as tofu, may be especially good for your heart. · Choose low-fat or fat-free milk and dairy products.   Eat foods high in fiber  · Eat a variety of grain products every day. Include whole-grain foods that have lots of fiber and nutrients. Examples of whole-grain foods include oats, whole wheat bread, and brown rice. · Buy whole-grain breads and cereals, instead of white bread or pastries. Limit salt and sodium  · Limit how much salt and sodium you eat to help lower your blood pressure. · Taste food before you salt it. Add only a little salt when you think you need it. With time, your taste buds will adjust to less salt. · Eat fewer snack items, fast foods, and other high-salt, processed foods. Check food labels for the amount of sodium in packaged foods. · Choose low-sodium versions of canned goods (such as soups, vegetables, and beans). Limit sugar  · Limit drinks and foods with added sugar. These include candy, desserts, and soda pop. Limit alcohol  · Limit alcohol to no more than 2 drinks a day for men and 1 drink a day for women. Too much alcohol can cause health problems. When should you call for help? Watch closely for changes in your health, and be sure to contact your doctor if:    · You would like help planning heart-healthy meals. Where can you learn more? Go to https://CircuportpeArsaniseb.healthL2C. org and sign in to your [employer] account. Enter V137 in the [facility] box to learn more about \"Heart-Healthy Diet: Care Instructions. \"     If you do not have an account, please click on the \"Sign Up Now\" link. Current as of: December 17, 2020               Content Version: 13.0  © 2006-2021 Healthwise[employer] Incorporated. Care instructions adapted under license by [facility] ([facility]). If you have questions about a medical condition or this instruction, always ask your healthcare professional. [place] [address] any warranty or liability for your use of this information.

## 2021-10-22 ENCOUNTER — OFFICE VISIT (OUTPATIENT)
Dept: PRIMARY CARE CLINIC | Age: 61
End: 2021-10-22
Payer: COMMERCIAL

## 2021-10-22 VITALS
OXYGEN SATURATION: 97 % | WEIGHT: 234 LBS | BODY MASS INDEX: 35.46 KG/M2 | RESPIRATION RATE: 16 BRPM | DIASTOLIC BLOOD PRESSURE: 81 MMHG | HEART RATE: 56 BPM | TEMPERATURE: 97 F | SYSTOLIC BLOOD PRESSURE: 148 MMHG | HEIGHT: 68 IN

## 2021-10-22 DIAGNOSIS — I10 PRIMARY HYPERTENSION: Primary | ICD-10-CM

## 2021-10-22 DIAGNOSIS — Z13.29 THYROID DISORDER SCREEN: ICD-10-CM

## 2021-10-22 DIAGNOSIS — E78.00 PURE HYPERCHOLESTEROLEMIA: ICD-10-CM

## 2021-10-22 DIAGNOSIS — E55.9 VITAMIN D DEFICIENCY: ICD-10-CM

## 2021-10-22 DIAGNOSIS — R06.09 DYSPNEA ON EXERTION: ICD-10-CM

## 2021-10-22 PROCEDURE — 99214 OFFICE O/P EST MOD 30 MIN: CPT | Performed by: NURSE PRACTITIONER

## 2021-10-22 RX ORDER — LOSARTAN POTASSIUM 50 MG/1
50 TABLET ORAL DAILY
Qty: 30 TABLET | Refills: 3 | Status: SHIPPED | OUTPATIENT
Start: 2021-10-22 | End: 2022-02-16

## 2021-10-22 ASSESSMENT — PATIENT HEALTH QUESTIONNAIRE - PHQ9
SUM OF ALL RESPONSES TO PHQ QUESTIONS 1-9: 0
SUM OF ALL RESPONSES TO PHQ QUESTIONS 1-9: 0
2. FEELING DOWN, DEPRESSED OR HOPELESS: 0
SUM OF ALL RESPONSES TO PHQ9 QUESTIONS 1 & 2: 0
SUM OF ALL RESPONSES TO PHQ QUESTIONS 1-9: 0
1. LITTLE INTEREST OR PLEASURE IN DOING THINGS: 0

## 2021-10-22 ASSESSMENT — ENCOUNTER SYMPTOMS
EYES NEGATIVE: 1
GASTROINTESTINAL NEGATIVE: 1

## 2021-10-22 NOTE — PROGRESS NOTES
SUBJECTIVE:    Patient ID: Jeffery Meza is a 62 y.o. male. Chief Complaint   Patient presents with    Hypertension         HPI:  He had an episode of feeling bad and heart racing. He hadn't been using salt. Before this happened he was eating some salt. He had htn in 2015 but has not recently been on medication. He does not have a cardiologist.  He does not smoke. He works and has noticed some chest tightness and shortness of breath. He has tried to stop the processed meats. He has been having some shortness of breath especially when walking or doing anything. His weight is at baseline. He has been trying to decrease portion. Patient's medications,allergies, past medical, surgical, social and family histories were reviewed and updated as appropriate. .  Current Outpatient Medications on File Prior to Visit   Medication Sig Dispense Refill    losartan (COZAAR) 25 MG tablet Take 1 tablet by mouth daily 30 tablet 3    rOPINIRole (REQUIP) 0.25 MG tablet Take 1 tablet by mouth Every Night, may increase to 2 tablets Every Night as needed 60 tablet 2    meloxicam (MOBIC) 15 MG tablet Take 1 tablet by mouth Daily. 90 tablet 1    pravastatin (PRAVACHOL) 20 MG tablet Take 1 tablet by mouth Daily. 30 tablet 5    hydrOXYzine (ATARAX) 25 MG tablet Take 1-2 tabs nightly as needed for itching (Patient not taking: Reported on 10/7/2021) 15 tablet 0    triamcinolone (KENALOG) 0.1 % cream Apply topically 2 times daily to rash as needed (Patient not taking: Reported on 10/7/2021) 80 g 1    diclofenac sodium (VOLTAREN) 1 % GEL Apply 4 g topically 4 times daily (Patient not taking: Reported on 4/28/2021) 4 Tube 1    vitamin D (ERGOCALCIFEROL) 97039 units CAPS capsule Take 1 capsule by mouth once a week (Patient not taking: Reported on 10/26/2020) 4 capsule 5     No current facility-administered medications on file prior to visit. Review of Systems   Constitutional: Negative. HENT: Negative. Eyes: Negative. Respiratory: Positive for chest tightness and shortness of breath (when walking). Cardiovascular: Negative. Gastrointestinal: Negative. Genitourinary: Negative. Musculoskeletal: Negative. Skin: Negative. Neurological: Negative. Psychiatric/Behavioral: Negative. OBJECTIVE:  BP (!) 148/81 (Site: Right Upper Arm, Position: Sitting, Cuff Size: Large Adult)   Pulse 56   Temp 97 °F (36.1 °C)   Resp 16   Ht 5' 8\" (1.727 m)   Wt 234 lb (106.1 kg)   SpO2 97%   BMI 35.58 kg/m²    Physical Exam  Vitals and nursing note reviewed. Constitutional:       Appearance: He is well-developed. HENT:      Head: Normocephalic and atraumatic. Eyes:      Conjunctiva/sclera: Conjunctivae normal.      Pupils: Pupils are equal, round, and reactive to light. Neck:      Thyroid: No thyromegaly. Vascular: No JVD. Cardiovascular:      Rate and Rhythm: Normal rate and regular rhythm. Heart sounds: No murmur heard. No friction rub. No gallop. Pulmonary:      Effort: Pulmonary effort is normal. No respiratory distress. Breath sounds: Normal breath sounds. No wheezing or rales. Abdominal:      General: Bowel sounds are normal. There is no distension. Palpations: Abdomen is soft. Tenderness: There is no abdominal tenderness. There is no guarding. Musculoskeletal:         General: No tenderness. Cervical back: Normal range of motion and neck supple. Skin:     General: Skin is warm and dry. Findings: No rash. Neurological:      Mental Status: He is alert and oriented to person, place, and time.    Psychiatric:         Judgment: Judgment normal.         Results in Past 30 Days  Result Component Current Result Ref Range Previous Result Ref Range   Albumin 4.6 (10/27/2021)  Not in Time Range    Alkaline Phosphatase 109 (10/27/2021) U/L Not in Time Range    ALT 32 (10/27/2021) U/L Not in Time Range    AST 27 (10/27/2021) U/L Not in Time Range BUN 18 (10/27/2021) mg/dL Not in Time Range    Calcium 9.5 (10/27/2021) mg/dL Not in Time Range    Chloride 105 (10/27/2021) mmol/L Not in Time Range    CO2 27.9 (10/27/2021) mmol/L Not in Time Range    CREATININE 1.11 (10/27/2021)  Not in Time Range    Gfr Calculated 68 (10/27/2021)  Not in Time Range    Glucose 102 (10/27/2021) mg/dL Not in Time Range    Potassium 4.4 (10/27/2021) mmol/L Not in Time Range    Sodium 138 (10/27/2021) mmol/L Not in Time Range    Total Bilirubin 0.6 (10/27/2021) 0.1 - 1.4 mg/dL Not in Time Range        LDL Calculated (mg/dL)   Date Value   10/27/2021 94           Lab Results   Component Value Date    TSH 3.010 10/27/2021         ASSESSMENT/PLAN:     Jocelyne Kiran was seen today for hypertension. Diagnoses and all orders for this visit:    Primary hypertension  -     losartan (COZAAR) 50 MG tablet; Take 1 tablet by mouth daily  -     Comprehensive Metabolic Panel; Future  -     CBC; Future  -     External Referral To Cardiology    Pure hypercholesterolemia  -     Lipid Panel; Future    Thyroid disorder screen  -     TSH without Reflex; Future    Vitamin D deficiency  -     Vitamin B12 & Folate; Future  -     Vitamin D 25 Hydroxy; Future    Dyspnea on exertion  -     External Referral To Cardiology      There are no discontinued medications.

## 2021-10-22 NOTE — PROGRESS NOTES
Chief Complaint   Patient presents with    Hyperlipidemia       Have you seen any other physician or provider since your last visit yes - EULA Peralta    Have you had any other diagnostic tests since your last visit? no    Have you changed or stopped any medications since your last visit?  yes - Blood Pressure-cozaar

## 2021-10-27 ENCOUNTER — LAB (OUTPATIENT)
Dept: LAB | Facility: HOSPITAL | Age: 61
End: 2021-10-27

## 2021-10-27 ENCOUNTER — TRANSCRIBE ORDERS (OUTPATIENT)
Dept: LAB | Facility: HOSPITAL | Age: 61
End: 2021-10-27

## 2021-10-27 DIAGNOSIS — E78.00 PURE HYPERCHOLESTEROLEMIA: ICD-10-CM

## 2021-10-27 DIAGNOSIS — I10 ESSENTIAL HYPERTENSION, BENIGN: ICD-10-CM

## 2021-10-27 DIAGNOSIS — E55.9 VITAMIN D DEFICIENCY: Primary | ICD-10-CM

## 2021-10-27 DIAGNOSIS — Z13.29 THYROID DISORDER SCREEN: ICD-10-CM

## 2021-10-27 DIAGNOSIS — E55.9 VITAMIN D DEFICIENCY: ICD-10-CM

## 2021-10-27 LAB
25(OH)D3 SERPL-MCNC: 29.3 NG/ML
ALBUMIN SERPL-MCNC: 4.6 G/DL
ALBUMIN SERPL-MCNC: 4.6 G/DL (ref 3.5–5.2)
ALBUMIN/GLOB SERPL: 1.5 G/DL
ALP BLD-CCNC: 109 U/L
ALP SERPL-CCNC: 109 U/L (ref 39–117)
ALT SERPL W P-5'-P-CCNC: 32 U/L (ref 1–41)
ALT SERPL-CCNC: 32 U/L
ANION GAP SERPL CALCULATED.3IONS-SCNC: 5.1 MMOL/L
ANION GAP SERPL CALCULATED.3IONS-SCNC: 5.1 MMOL/L (ref 5–15)
AST SERPL-CCNC: 27 U/L
AST SERPL-CCNC: 27 U/L (ref 1–40)
BASOPHILS ABSOLUTE: NORMAL
BASOPHILS RELATIVE PERCENT: NORMAL
BILIRUB SERPL-MCNC: 0.6 MG/DL (ref 0.1–1.4)
BILIRUB SERPL-MCNC: 0.6 MG/DL (ref 0–1.2)
BUN BLDV-MCNC: 18 MG/DL
BUN SERPL-MCNC: 18 MG/DL (ref 8–23)
BUN/CREAT SERPL: 16.2 (ref 7–25)
CALCIUM SERPL-MCNC: 9.5 MG/DL
CALCIUM SPEC-SCNC: 9.5 MG/DL (ref 8.6–10.5)
CHLORIDE BLD-SCNC: 105 MMOL/L
CHLORIDE SERPL-SCNC: 105 MMOL/L (ref 98–107)
CHOLEST SERPL-MCNC: 139 MG/DL (ref 0–200)
CHOLESTEROL, TOTAL: 139 MG/DL
CHOLESTEROL/HDL RATIO: ABNORMAL
CO2 SERPL-SCNC: 27.9 MMOL/L (ref 22–29)
CO2: 27.9 MMOL/L
CREAT SERPL-MCNC: 1.11 MG/DL
CREAT SERPL-MCNC: 1.11 MG/DL (ref 0.76–1.27)
DEPRECATED RDW RBC AUTO: 41.7 FL (ref 37–54)
EOSINOPHILS ABSOLUTE: NORMAL
EOSINOPHILS RELATIVE PERCENT: NORMAL
ERYTHROCYTE [DISTWIDTH] IN BLOOD BY AUTOMATED COUNT: 13 % (ref 12.3–15.4)
FOLATE SERPL-MCNC: 12 NG/ML (ref 4.78–24.2)
FOLATE: 12
GFR CALCULATED: 68
GFR SERPL CREATININE-BSD FRML MDRD: 68 ML/MIN/1.73
GLOBULIN UR ELPH-MCNC: 3.1 GM/DL
GLUCOSE BLD-MCNC: 102 MG/DL
GLUCOSE SERPL-MCNC: 102 MG/DL (ref 65–99)
HCT VFR BLD AUTO: 44.7 % (ref 37.5–51)
HCT VFR BLD CALC: 44.7 % (ref 41–53)
HDLC SERPL-MCNC: 26 MG/DL (ref 35–70)
HDLC SERPL-MCNC: 26 MG/DL (ref 40–60)
HEMOGLOBIN: 15 G/DL (ref 13.5–17.5)
HGB BLD-MCNC: 15 G/DL (ref 13–17.7)
LDL CHOLESTEROL CALCULATED: 94 MG/DL (ref 0–160)
LDLC SERPL CALC-MCNC: 94 MG/DL (ref 0–100)
LDLC/HDLC SERPL: 3.55 {RATIO}
LYMPHOCYTES ABSOLUTE: NORMAL
LYMPHOCYTES RELATIVE PERCENT: NORMAL
MCH RBC QN AUTO: 29.8 PG
MCH RBC QN AUTO: 29.8 PG (ref 26.6–33)
MCHC RBC AUTO-ENTMCNC: 33.6 G/DL
MCHC RBC AUTO-ENTMCNC: 33.6 G/DL (ref 31.5–35.7)
MCV RBC AUTO: 88.7 FL
MCV RBC AUTO: 88.7 FL (ref 79–97)
MONOCYTES ABSOLUTE: NORMAL
MONOCYTES RELATIVE PERCENT: NORMAL
NEUTROPHILS ABSOLUTE: NORMAL
NEUTROPHILS RELATIVE PERCENT: NORMAL
NONHDLC SERPL-MCNC: ABNORMAL MG/DL
PLATELET # BLD AUTO: 250 10*3/MM3 (ref 140–450)
PLATELET # BLD: 250 K/ΜL
PMV BLD AUTO: 10.5 FL
PMV BLD AUTO: 10.5 FL (ref 6–12)
POTASSIUM SERPL-SCNC: 4.4 MMOL/L
POTASSIUM SERPL-SCNC: 4.4 MMOL/L (ref 3.5–5.2)
PROT SERPL-MCNC: 7.7 G/DL (ref 6–8.5)
RBC # BLD AUTO: 5.04 10*6/MM3 (ref 4.14–5.8)
RBC # BLD: 5.04 10^6/ΜL
SODIUM BLD-SCNC: 138 MMOL/L
SODIUM SERPL-SCNC: 138 MMOL/L (ref 136–145)
TOTAL PROTEIN: 7.7
TRIGL SERPL-MCNC: 103 MG/DL
TRIGL SERPL-MCNC: 103 MG/DL (ref 0–150)
TSH SERPL DL<=0.05 MIU/L-ACNC: 3.01 UIU/ML
TSH SERPL DL<=0.05 MIU/L-ACNC: 3.01 UIU/ML (ref 0.27–4.2)
VIT B12 BLD-MCNC: 515 PG/ML (ref 211–946)
VITAMIN B-12: 515
VITAMIN D 25-HYDROXY: 29.3
VITAMIN D2, 25 HYDROXY: NORMAL
VITAMIN D3,25 HYDROXY: NORMAL
VLDLC SERPL CALC-MCNC: 3.55 MG/DL
VLDLC SERPL-MCNC: 19 MG/DL (ref 5–40)
WBC # BLD AUTO: 6.29 10*3/MM3 (ref 3.4–10.8)
WBC # BLD: 6.29 10^3/ML

## 2021-10-27 PROCEDURE — 82607 VITAMIN B-12: CPT

## 2021-10-27 PROCEDURE — 36415 COLL VENOUS BLD VENIPUNCTURE: CPT

## 2021-10-27 PROCEDURE — 84443 ASSAY THYROID STIM HORMONE: CPT

## 2021-10-27 PROCEDURE — 82746 ASSAY OF FOLIC ACID SERUM: CPT

## 2021-10-27 PROCEDURE — 85027 COMPLETE CBC AUTOMATED: CPT

## 2021-10-27 PROCEDURE — 80061 LIPID PANEL: CPT

## 2021-10-27 PROCEDURE — 82306 VITAMIN D 25 HYDROXY: CPT

## 2021-10-27 PROCEDURE — 80053 COMPREHEN METABOLIC PANEL: CPT

## 2021-10-29 DIAGNOSIS — E78.00 PURE HYPERCHOLESTEROLEMIA: ICD-10-CM

## 2021-10-29 DIAGNOSIS — Z13.29 THYROID DISORDER SCREEN: ICD-10-CM

## 2021-10-29 DIAGNOSIS — I10 PRIMARY HYPERTENSION: ICD-10-CM

## 2021-10-29 DIAGNOSIS — E55.9 VITAMIN D DEFICIENCY: ICD-10-CM

## 2021-11-06 ASSESSMENT — ENCOUNTER SYMPTOMS
CHEST TIGHTNESS: 1
SHORTNESS OF BREATH: 1

## 2021-12-01 NOTE — PROGRESS NOTES
[facility] Cardiology  [address], [address]  [place], [place], [address]    [phone]  [facility]           OUTPATIENT CLINIC CONSULTATION NOTE    Patient care team:  Patient Care Team:  Huyen Hdez APRN as PCP - General (Family Medicine)    Requesting Provider and Reason for consultation: The patient is being seen today at the request of LOUISE Urban for palpitations, dyspnea on exertion.     Subjective:   Chief complaint:   Chief Complaint   Patient presents with   • Irregular Heart Beat   • Palpitations       HPI:    Derrick Rhoades is a 61 y.o. male.  Partial problem list, including cardiac problems:  1. Left ventricular hypertrophy  a. Per EKG 12/03/2021  2. Hypertension  3. Hypercholesterolemia  4. Thyroid disorder  5. Vitamin D deficiency    Presents today for consultation.    Patient referred by PCP after isolated episode of palpitations and shortness of breath approximately 1 month ago.  Patient felt like his heart was fluttering and it was hard to get a deep breath.  Was evaluated in the emergency department.  Cardiac work-up at that time was negative.  Patient's blood pressure was elevated and his PCP started him on losartan at that time.  Since taking losartan, patient reports his dyspnea has improved.  His job is very physically demanding, and he is able to complete his work without complication.  He has had no recurrent palpitations.      Family history includes his mother who had a CABG and a brother with coronary artery disease and stent placement.    Denies chest pain, lower extremity edema, lightheadedness and syncope.    Review of Systems:  Positive for palpitations, dyspnea on exertion  All other systems are reviewed and are negative    PFSH:  Patient Active Problem List   Diagnosis   • Essential hypertension   • Hypercholesterolemia   • Dyspnea on exertion   • Thyroid disorder   • Vitamin D deficiency   • Palpitations   • Left ventricular  "hypertrophy   • Heart murmur         Current Outpatient Medications:   •  doxycycline (MONODOX) 100 MG capsule, Take 1 capsule by mouth Every 12 (Twelve) Hours for 7 days., Disp: 14 capsule, Rfl: 0  •  Fluticasone Furoate-Vilanterol (Breo Ellipta) 200-25 MCG/INH inhaler, Inhale 1 puff Daily., Disp: 60 each, Rfl: 0  •  losartan (COZAAR) 50 MG tablet, Take 1 tablet by mouth Daily., Disp: , Rfl:   •  meloxicam (MOBIC) 15 MG tablet, Take 1 tablet by mouth Daily., Disp: , Rfl:   •  pravastatin (PRAVACHOL) 20 MG tablet, Take 1 tablet by mouth Daily., Disp: , Rfl:   •  rOPINIRole (REQUIP) 0.25 MG tablet, Take  by mouth., Disp: , Rfl:     Allergies   Allergen Reactions   • Amoxicillin Shortness Of Breath   • Ampicillin Shortness Of Breath     choking   • Lisinopril Other (See Comments)     cough       Social History     Socioeconomic History   • Marital status:    Tobacco Use   • Smoking status: Former Smoker     Years: 30.00   • Smokeless tobacco: Never Used   Vaping Use   • Vaping Use: Never used   Substance and Sexual Activity   • Alcohol use: Never   • Drug use: Never     Family History   Problem Relation Age of Onset   • Heart disease Mother    • COPD Father    • Stroke Sister    • Heart attack Brother          Objective:   Physical Exam:  /78   Pulse 78   Ht 182.9 cm (72\")   Wt 107 kg (235 lb)   SpO2 94%   BMI 31.87 kg/m²     CONSTITUTIONAL: Well-nourished. In no acute distress.   SKIN: Warm and dry. No rashes noted  LUNGS: Normal effort. Clear to auscultation bilaterally without wheezing, rhonchi, or rales noted.   CARDIOVASCULAR: Regular rate and rhythm with a normal S1 and S2. There is no murmur, gallop, rub, or click appreciated. Carotid upstrokes are 2+ and symmetrical without bruits.  2+ radial pulses bilaterally.  There is no peripheral edema.   PSYCHIATRIC: Alert, orientated, appropriate affect and mood      Labs:  BUN   Date Value Ref Range Status   10/27/2021 18 8 - 23 mg/dL Final "     Creatinine   Date Value Ref Range Status   10/27/2021 1.11 0.76 - 1.27 mg/dL Final     Potassium   Date Value Ref Range Status   10/27/2021 4.4 3.5 - 5.2 mmol/L Final     ALT (SGPT)   Date Value Ref Range Status   10/27/2021 32 1 - 41 U/L Final     AST (SGOT)   Date Value Ref Range Status   10/27/2021 27 1 - 40 U/L Final     WBC   Date Value Ref Range Status   10/27/2021 6.29 3.40 - 10.80 10*3/mm3 Final   10/04/2021 6.7 4.0 - 11.0 K/uL Final     Hemoglobin   Date Value Ref Range Status   10/27/2021 15.0 13.0 - 17.7 g/dL Final   10/04/2021 15.0 13.0 - 18.0 g/dL Final     Hematocrit   Date Value Ref Range Status   10/27/2021 44.7 37.5 - 51.0 % Final   10/04/2021 45.2 40.0 - 54.0 % Final     Platelets   Date Value Ref Range Status   10/27/2021 250 140 - 450 10*3/mm3 Final   10/04/2021 248 150 - 400 K/uL Final       Lab Results   Component Value Date    CHOL 139 10/27/2021    CHOL 148 10/29/2020    CHOL 175 03/11/2019     Lab Results   Component Value Date    TRIG 103 10/27/2021    TRIG 119 10/29/2020    TRIG 147 03/11/2019     Lab Results   Component Value Date    HDL 26 (L) 10/27/2021    HDL 28 (L) 10/29/2020    HDL 34 (L) 03/11/2019     Lab Results   Component Value Date    LDL 94 10/27/2021     No components found for: LDLDIRECTC    Diagnostic Data:      ECG 12 Lead    Date/Time: 12/3/2021 1:39 PM  Performed by: Derrick Alvarez MD  Authorized by: Derrick Alvarez MD   Comparison: compared with previous ECG from 3/28/2014  Comparison to previous ECG: Normal sinus rhythm  Rhythm: sinus rhythm  Rate: normal  BPM: 71  Conduction: conduction normal                Assessment and Plan:   Diagnoses and all orders for this visit:    Left ventricular hypertrophy  Heart murmur  Inferior Q waves  -Echocardiogram to further evaluate murmur, LVH, rule out wall motion abnormality in setting of inferior Qs.    Essential hypertension   -Symptoms improved with better BP control  -Continue losartan.    Hypercholesterolemia  -Continue  pravastatin.  -Follow-up lipid panel and LFTs with PCP.    Dyspnea on exertion  -Echo as noted above    Palpitations  -No recurrent palpitations.  -Continue to monitor clinically      - Return if symptoms worsen or fail to improve.    Scribed for Derrick Alvarez MD by Zee Santiago, APRN. 12/3/2021  14:04 EST    I, Derrick Alvarez MD, personally performed the services as scribed by the above named individual. I have made any necessary edits and it is both accurate and complete.     Derrick Alvarez MD, MSc, FACC, [facility]  Interventional Cardiology  [facility]

## 2021-12-03 ENCOUNTER — HOSPITAL ENCOUNTER (OUTPATIENT)
Facility: HOSPITAL | Age: 61
Discharge: HOME | End: 2021-12-03
Payer: COMMERCIAL

## 2021-12-03 ENCOUNTER — OFFICE VISIT (OUTPATIENT)
Dept: CARDIOLOGY | Facility: CLINIC | Age: 61
End: 2021-12-03

## 2021-12-03 VITALS
WEIGHT: 235 LBS | BODY MASS INDEX: 31.83 KG/M2 | SYSTOLIC BLOOD PRESSURE: 144 MMHG | DIASTOLIC BLOOD PRESSURE: 78 MMHG | HEART RATE: 78 BPM | HEIGHT: 72 IN | OXYGEN SATURATION: 94 %

## 2021-12-03 DIAGNOSIS — I51.7 LEFT VENTRICULAR HYPERTROPHY: ICD-10-CM

## 2021-12-03 DIAGNOSIS — I10 ESSENTIAL HYPERTENSION: Primary | ICD-10-CM

## 2021-12-03 DIAGNOSIS — R06.09 DYSPNEA ON EXERTION: ICD-10-CM

## 2021-12-03 DIAGNOSIS — R01.1 HEART MURMUR: ICD-10-CM

## 2021-12-03 DIAGNOSIS — R00.2 PALPITATIONS: ICD-10-CM

## 2021-12-03 DIAGNOSIS — E78.00 HYPERCHOLESTEROLEMIA: ICD-10-CM

## 2021-12-03 PROBLEM — E55.9 VITAMIN D DEFICIENCY: Status: ACTIVE | Noted: 2021-12-03

## 2021-12-03 PROBLEM — E07.9 THYROID DISORDER: Status: ACTIVE | Noted: 2021-12-03

## 2021-12-03 PROCEDURE — 99204 OFFICE O/P NEW MOD 45 MIN: CPT | Performed by: INTERNAL MEDICINE

## 2021-12-03 PROCEDURE — 93005 ELECTROCARDIOGRAM TRACING: CPT

## 2021-12-03 PROCEDURE — 93000 ELECTROCARDIOGRAM COMPLETE: CPT | Performed by: INTERNAL MEDICINE

## 2021-12-06 ENCOUNTER — TELEPHONE (OUTPATIENT)
Dept: PRIMARY CARE CLINIC | Age: 61
End: 2021-12-06

## 2021-12-06 DIAGNOSIS — E78.00 PURE HYPERCHOLESTEROLEMIA: ICD-10-CM

## 2021-12-06 DIAGNOSIS — M19.079 ARTHRITIS OF BIG TOE: ICD-10-CM

## 2021-12-06 RX ORDER — PRAVASTATIN SODIUM 20 MG
TABLET ORAL
Qty: 30 TABLET | Refills: 5 | Status: SHIPPED | OUTPATIENT
Start: 2021-12-06 | End: 2022-06-21

## 2021-12-06 RX ORDER — MELOXICAM 15 MG/1
TABLET ORAL
Qty: 90 TABLET | Refills: 1 | Status: SHIPPED | OUTPATIENT
Start: 2021-12-06 | End: 2022-08-10

## 2021-12-06 NOTE — TELEPHONE ENCOUNTER
Maya Masterson would like a substitute for ropinirole as he says his pharmacy told him that they no longer have it, he wants the substitute sent to [facility] in [place]

## 2021-12-09 ENCOUNTER — HOSPITAL ENCOUNTER (OUTPATIENT)
Dept: CARDIOLOGY | Facility: HOSPITAL | Age: 61
Discharge: HOME | End: 2021-12-09
Admitting: HOSPITALIST

## 2021-12-09 DIAGNOSIS — R01.1 HEART MURMUR: ICD-10-CM

## 2021-12-09 DIAGNOSIS — I51.7 LEFT VENTRICULAR HYPERTROPHY: ICD-10-CM

## 2021-12-09 PROCEDURE — 93306 TTE W/DOPPLER COMPLETE: CPT

## 2021-12-09 PROCEDURE — 93306 TTE W/DOPPLER COMPLETE: CPT | Performed by: INTERNAL MEDICINE

## 2021-12-10 LAB
BH CV ECHO MEAS - % IVS THICK: 12.6 %
BH CV ECHO MEAS - % LVPW THICK: 7.3 %
BH CV ECHO MEAS - AO MAX PG (FULL): 4.1 MMHG
BH CV ECHO MEAS - AO MAX PG: 7 MMHG
BH CV ECHO MEAS - AO MEAN PG (FULL): 2 MMHG
BH CV ECHO MEAS - AO MEAN PG: 3 MMHG
BH CV ECHO MEAS - AO ROOT AREA (BSA CORRECTED): 1.3
BH CV ECHO MEAS - AO ROOT AREA: 7.3 CM^2
BH CV ECHO MEAS - AO ROOT DIAM: 3 CM
BH CV ECHO MEAS - AO V2 MAX: 136 CM/SEC
BH CV ECHO MEAS - AO V2 MEAN: 80.6 CM/SEC
BH CV ECHO MEAS - AO V2 VTI: 25.2 CM
BH CV ECHO MEAS - AVA(I,A): 1.7 CM^2
BH CV ECHO MEAS - AVA(I,D): 1.7 CM^2
BH CV ECHO MEAS - AVA(V,A): 1.4 CM^2
BH CV ECHO MEAS - AVA(V,D): 1.4 CM^2
BH CV ECHO MEAS - BSA(HAYCOCK): 2.4 M^2
BH CV ECHO MEAS - BSA: 2.3 M^2
BH CV ECHO MEAS - BZI_BMI: 31.9 KILOGRAMS/M^2
BH CV ECHO MEAS - BZI_METRIC_HEIGHT: 182.9 CM
BH CV ECHO MEAS - BZI_METRIC_WEIGHT: 106.6 KG
BH CV ECHO MEAS - EDV(CUBED): 31.9 ML
BH CV ECHO MEAS - EDV(MOD-SP2): 123 ML
BH CV ECHO MEAS - EDV(MOD-SP4): 133 ML
BH CV ECHO MEAS - EDV(TEICH): 40 ML
BH CV ECHO MEAS - EF(CUBED): 60.3 %
BH CV ECHO MEAS - EF(MOD-BP): 61.6 %
BH CV ECHO MEAS - EF(MOD-SP2): 61.1 %
BH CV ECHO MEAS - EF(MOD-SP4): 65.4 %
BH CV ECHO MEAS - EF(TEICH): 53.2 %
BH CV ECHO MEAS - ESV(CUBED): 12.6 ML
BH CV ECHO MEAS - ESV(MOD-SP2): 47.8 ML
BH CV ECHO MEAS - ESV(MOD-SP4): 46 ML
BH CV ECHO MEAS - ESV(TEICH): 18.7 ML
BH CV ECHO MEAS - FS: 26.5 %
BH CV ECHO MEAS - IVS/LVPW: 1.2
BH CV ECHO MEAS - IVSD: 2 CM
BH CV ECHO MEAS - IVSS: 2.2 CM
BH CV ECHO MEAS - LA DIMENSION: 4.4 CM
BH CV ECHO MEAS - LA/AO: 1.4
BH CV ECHO MEAS - LAD MAJOR: 6.3 CM
BH CV ECHO MEAS - LAT PEAK E' VEL: 16.9 CM/SEC
BH CV ECHO MEAS - LATERAL E/E' RATIO: 4.1
BH CV ECHO MEAS - LV DIASTOLIC VOL/BSA (35-75): 58.3 ML/M^2
BH CV ECHO MEAS - LV MASS(C)D: 235.7 GRAMS
BH CV ECHO MEAS - LV MASS(C)DI: 103.3 GRAMS/M^2
BH CV ECHO MEAS - LV MASS(C)S: 201.1 GRAMS
BH CV ECHO MEAS - LV MASS(C)SI: 88.1 GRAMS/M^2
BH CV ECHO MEAS - LV MAX PG: 2.9 MMHG
BH CV ECHO MEAS - LV MEAN PG: 1 MMHG
BH CV ECHO MEAS - LV SYSTOLIC VOL/BSA (12-30): 20.2 ML/M^2
BH CV ECHO MEAS - LV V1 MAX: 85.7 CM/SEC
BH CV ECHO MEAS - LV V1 MEAN: 53.8 CM/SEC
BH CV ECHO MEAS - LV V1 VTI: 19.4 CM
BH CV ECHO MEAS - LVIDD: 3.2 CM
BH CV ECHO MEAS - LVIDS: 2.3 CM
BH CV ECHO MEAS - LVLD AP2: 8.4 CM
BH CV ECHO MEAS - LVLD AP4: 8.9 CM
BH CV ECHO MEAS - LVLS AP2: 8 CM
BH CV ECHO MEAS - LVLS AP4: 6.8 CM
BH CV ECHO MEAS - LVOT AREA (M): 2.2 CM^2
BH CV ECHO MEAS - LVOT AREA: 2.2 CM^2
BH CV ECHO MEAS - LVOT DIAM: 1.7 CM
BH CV ECHO MEAS - LVPWD: 1.6 CM
BH CV ECHO MEAS - LVPWS: 1.8 CM
BH CV ECHO MEAS - MED PEAK E' VEL: 9.6 CM/SEC
BH CV ECHO MEAS - MEDIAL E/E' RATIO: 7.3
BH CV ECHO MEAS - MV A MAX VEL: 73.2 CM/SEC
BH CV ECHO MEAS - MV DEC TIME: 0.27 SEC
BH CV ECHO MEAS - MV E MAX VEL: 69.4 CM/SEC
BH CV ECHO MEAS - MV E/A: 0.95
BH CV ECHO MEAS - MV MAX PG: 2.6 MMHG
BH CV ECHO MEAS - MV MEAN PG: 1 MMHG
BH CV ECHO MEAS - MV V2 MAX: 80.3 CM/SEC
BH CV ECHO MEAS - MV V2 MEAN: 51.4 CM/SEC
BH CV ECHO MEAS - MV V2 VTI: 27.1 CM
BH CV ECHO MEAS - MVA(VTI): 1.5 CM^2
BH CV ECHO MEAS - PA ACC TIME: 0.12 SEC
BH CV ECHO MEAS - PA MAX PG (FULL): 1.4 MMHG
BH CV ECHO MEAS - PA MAX PG: 4.8 MMHG
BH CV ECHO MEAS - PA MEAN PG (FULL): 0 MMHG
BH CV ECHO MEAS - PA MEAN PG: 2 MMHG
BH CV ECHO MEAS - PA PR(ACCEL): 23.7 MMHG
BH CV ECHO MEAS - PA V2 MAX: 109 CM/SEC
BH CV ECHO MEAS - PA V2 MEAN: 71.9 CM/SEC
BH CV ECHO MEAS - PA V2 VTI: 27.5 CM
BH CV ECHO MEAS - RAP SYSTOLE: 3 MMHG
BH CV ECHO MEAS - RV MAX PG: 3.4 MMHG
BH CV ECHO MEAS - RV MEAN PG: 2 MMHG
BH CV ECHO MEAS - RV V1 MAX: 91.6 CM/SEC
BH CV ECHO MEAS - RV V1 MEAN: 60.7 CM/SEC
BH CV ECHO MEAS - RV V1 VTI: 21.7 CM
BH CV ECHO MEAS - SI(AO): 80.2 ML/M^2
BH CV ECHO MEAS - SI(CUBED): 8.4 ML/M^2
BH CV ECHO MEAS - SI(LVOT): 18.4 ML/M^2
BH CV ECHO MEAS - SI(MOD-SP2): 33 ML/M^2
BH CV ECHO MEAS - SI(MOD-SP4): 38.1 ML/M^2
BH CV ECHO MEAS - SI(TEICH): 9.3 ML/M^2
BH CV ECHO MEAS - SV(AO): 182.9 ML
BH CV ECHO MEAS - SV(CUBED): 19.2 ML
BH CV ECHO MEAS - SV(LVOT): 42 ML
BH CV ECHO MEAS - SV(MOD-SP2): 75.2 ML
BH CV ECHO MEAS - SV(MOD-SP4): 87 ML
BH CV ECHO MEAS - SV(TEICH): 21.3 ML
BH CV ECHO MEAS - TAPSE (>1.6): 2.1 CM
BH CV ECHO MEASUREMENTS AVERAGE E/E' RATIO: 5.24
BH CV XLRA - RV BASE: 4.6 CM
BH CV XLRA - RV LENGTH: 7.5 CM
BH CV XLRA - RV MID: 4 CM
BH CV XLRA - TDI S': 9.5 CM/SEC
LEFT ATRIUM VOLUME INDEX: 27.1 ML/M^2
LEFT ATRIUM VOLUME: 61.9 ML
LV EF 2D ECHO EST: 66 %
MAXIMAL PREDICTED HEART RATE: 160 BPM
STRESS TARGET HR: 136 BPM

## 2021-12-14 ENCOUNTER — TELEPHONE (OUTPATIENT)
Dept: CARDIOLOGY | Facility: CLINIC | Age: 61
End: 2021-12-14

## 2021-12-14 RX ORDER — PRAMIPEXOLE DIHYDROCHLORIDE 0.25 MG/1
0.25 TABLET ORAL NIGHTLY
Qty: 90 TABLET | Refills: 3 | Status: SHIPPED | OUTPATIENT
Start: 2021-12-14

## 2021-12-14 NOTE — TELEPHONE ENCOUNTER
----- Message from LOUISE Bundy sent at 12/13/2021  5:19 PM EST -----  Can you let the patient know that overall his echo looked ok but does show some evidence of hypertensive heart disease.  Recommend focusing on risk factor modification. Thanks!

## 2022-02-15 DIAGNOSIS — I10 PRIMARY HYPERTENSION: ICD-10-CM

## 2022-02-16 RX ORDER — LOSARTAN POTASSIUM 50 MG/1
50 TABLET ORAL DAILY
Qty: 30 TABLET | Refills: 3 | Status: SHIPPED | OUTPATIENT
Start: 2022-02-16 | End: 2022-06-21

## 2022-03-20 DIAGNOSIS — G25.81 RLS (RESTLESS LEGS SYNDROME): ICD-10-CM

## 2022-03-21 RX ORDER — ROPINIROLE 0.25 MG/1
TABLET, FILM COATED ORAL
Qty: 60 TABLET | Refills: 2 | Status: SHIPPED | OUTPATIENT
Start: 2022-03-21 | End: 2022-08-15

## 2022-06-21 DIAGNOSIS — I10 PRIMARY HYPERTENSION: ICD-10-CM

## 2022-06-21 DIAGNOSIS — E78.00 PURE HYPERCHOLESTEROLEMIA: ICD-10-CM

## 2022-06-21 RX ORDER — PRAVASTATIN SODIUM 20 MG
TABLET ORAL
Qty: 30 TABLET | Refills: 5 | Status: SHIPPED | OUTPATIENT
Start: 2022-06-21

## 2022-06-21 RX ORDER — LOSARTAN POTASSIUM 50 MG/1
50 TABLET ORAL DAILY
Qty: 30 TABLET | Refills: 3 | Status: SHIPPED | OUTPATIENT
Start: 2022-06-21 | End: 2022-10-20

## 2022-06-28 ENCOUNTER — OFFICE VISIT (OUTPATIENT)
Dept: PRIMARY CARE CLINIC | Age: 62
End: 2022-06-28
Payer: COMMERCIAL

## 2022-06-28 VITALS
OXYGEN SATURATION: 97 % | BODY MASS INDEX: 34.86 KG/M2 | WEIGHT: 230 LBS | SYSTOLIC BLOOD PRESSURE: 130 MMHG | RESPIRATION RATE: 14 BRPM | TEMPERATURE: 97.6 F | HEIGHT: 68 IN | DIASTOLIC BLOOD PRESSURE: 78 MMHG | HEART RATE: 64 BPM

## 2022-06-28 DIAGNOSIS — E78.00 PURE HYPERCHOLESTEROLEMIA: ICD-10-CM

## 2022-06-28 DIAGNOSIS — I49.9 IRREGULAR HEART BEAT: Primary | ICD-10-CM

## 2022-06-28 DIAGNOSIS — R53.83 FATIGUE, UNSPECIFIED TYPE: ICD-10-CM

## 2022-06-28 DIAGNOSIS — E55.9 VITAMIN D DEFICIENCY: ICD-10-CM

## 2022-06-28 PROCEDURE — 99214 OFFICE O/P EST MOD 30 MIN: CPT | Performed by: NURSE PRACTITIONER

## 2022-06-28 SDOH — ECONOMIC STABILITY: FOOD INSECURITY: WITHIN THE PAST 12 MONTHS, THE FOOD YOU BOUGHT JUST DIDN'T LAST AND YOU DIDN'T HAVE MONEY TO GET MORE.: NEVER TRUE

## 2022-06-28 SDOH — ECONOMIC STABILITY: FOOD INSECURITY: WITHIN THE PAST 12 MONTHS, YOU WORRIED THAT YOUR FOOD WOULD RUN OUT BEFORE YOU GOT MONEY TO BUY MORE.: NEVER TRUE

## 2022-06-28 ASSESSMENT — ENCOUNTER SYMPTOMS
RESPIRATORY NEGATIVE: 1
EYES NEGATIVE: 1
GASTROINTESTINAL NEGATIVE: 1

## 2022-06-28 ASSESSMENT — PATIENT HEALTH QUESTIONNAIRE - PHQ9
1. LITTLE INTEREST OR PLEASURE IN DOING THINGS: 0
SUM OF ALL RESPONSES TO PHQ QUESTIONS 1-9: 0
2. FEELING DOWN, DEPRESSED OR HOPELESS: 0
SUM OF ALL RESPONSES TO PHQ QUESTIONS 1-9: 0
SUM OF ALL RESPONSES TO PHQ9 QUESTIONS 1 & 2: 0

## 2022-06-28 ASSESSMENT — SOCIAL DETERMINANTS OF HEALTH (SDOH): HOW HARD IS IT FOR YOU TO PAY FOR THE VERY BASICS LIKE FOOD, HOUSING, MEDICAL CARE, AND HEATING?: NOT VERY HARD

## 2022-06-28 NOTE — PROGRESS NOTES
Chief Complaint   Patient presents with    Hypertension    Hyperlipidemia       Have you seen any other physician or provider since your last visit no    Have you had any other diagnostic tests since your last visit? no    Have you changed or stopped any medications since your last visit? no     I have recommended that this patient have a sigmoidoscopy but he due to refusal reason: no symptoms  I have discussed the risks and benefits of this examination with him. The patient verbalizes understanding. Provider will be informed of refusal.      Diabetic retinal exam completed this year?  Yes                       * If yes please have patient sign a records release to obtain record to update Health Maintenance                       * If no, please order referral for patient to be scheduled  Patient here for follow up also complaint of of episode with his heart last Thursday lasts about 30 minutes

## 2022-06-28 NOTE — PATIENT INSTRUCTIONS
· Keep a list of your medicines with you. List all of the prescription medicines, nonprescription medicines, supplements, natural remedies, and vitamins that you take. Tell your healthcare providers who treat you about all of the products you are taking. Your provider can provide you with a form to keep track of them. Just ask. · Follow the directions that come with your medicine, including information about food or alcohol. Make sure you know how and when to take your medicine. Do not take more or less than you are supposed to take. · Keep all medicines out of the reach of children. · Store medicines according to the directions on the label. · Monitor yourself. Learn to know how your body reacts to your new medicine and keep track of how it makes you feel before attempting (If your provider has allowed you to do so) to drive or go to work. · Seek emergency medical attention if you think you have used too much of this medicine. An overdose of any prescription medicine can be fatal. Overdose symptoms may include extreme drowsiness, muscle weakness, confusion, cold and clammy skin, pinpoint pupils, shallow breathing, slow heart rate, fainting, or coma. · Don't share prescription medicines with others, even when they seem to have the same symptoms. What may be good for you may be harmful to others. · If you are no longer taking a prescribed medication and you have pills left please take your pills out of their original containers. Mix crushed pills with an undesirable substance, such as cat litter or used coffee grounds. Put the mixture into a disposable container with a lid, such as an empty margarine tub, or into a sealable bag. Cover up or remove any of your personal information on the empty containers by covering it with black permanent marker or duct tape. Place the sealed container with the mixture, and the empty drug containers, in the trash.    · If you use a medication that is in the form of a patch, dispose of used patches by folding them in half so that the sticky sides meet, and then flushing them down a toilet. They should not be placed in the household trash where children or pets can find them. · If you have any questions, ask your provider or pharmacist for more information. · Be sure to keep all appointments for provider visits or tests. We are committed to providing you with the best care possible. In order to help us achieve these goals please remember to bring all medications, herbal products, and over the counter supplements with you to each visit. If your provider has ordered testing for you, please be sure to follow up with our office if you have not received results within 7 days after the testing took place. *If you receive a survey after visiting one of our offices, please take time to share your experience concerning your physician office visit. These surveys are confidential and no health information about you is shared. We are eager to improve for you and we are counting on your feedback to help make that happen. ips to Help You Stop Smoking       Cigarette smoking is a preventable cause of death in the [facility]. If you have thought about quitting but haven't been able to, here are some reasons why you should and some ways to do it. Here's Why   Quitting smoking now can decrease your risk of getting smoking-related illnesses like:   Heart disease   Stroke   Several types of cancer, including:   Lung   Mouth   Esophagus   Larynx   Bladder   Pancreas   Kidney   Chronic lung diseases:   Bronchitis   Emphysema   Asthma   Cataracts   Macular degeneration   Thyroid conditions   Hearing loss   Erectile dysfunction   Dementia   Osteoporosis   Here's How   Once you've decided to quit smoking, set your target quit date a few weeks away.  In the time leading up to your quit day, try some of these ideas offered by the [address] [place] [address] [place] to help you successfully quit smoking. For the best results, work with your doctor. Together, you can test your lung function and compare the results to those of a nonsmoking person. The results can be given to you as your lung age. Finding out your lung age right after having the test done may help you to stop smoking. Your doctor can also discuss with you all of your options and refer you to smoking-cessation support groups. You may wish to use nicotine replacement (gum, patches, inhaler) or one of the prescription medications that have been shown to increase quit rates and prolong abstinence from smoking. But whatever you and your doctor decide on these matters, it will still be you who decides when an how to quit. Here are some techniques:   Switch Brands   Switch to a brand you find distasteful. Change to a brand that is low in tar and nicotine a couple of weeks before your target quit date. This will help change your smoking behavior. However, do not smoke more cigarettes, inhale them more often or more deeply, or place your fingertips over the holes in the filters. All of these actions will increase your nicotine intake, and the idea is to get your body used to functioning without nicotine. Cut Down the Number of Cigarettes You Smoke   Smoke only half of each cigarette. Each day, postpone the lighting of your first cigarette by one hour. Decide you'll only smoke during odd or even hours of the day. Decide beforehand how many cigarettes you'll smoke during the day. For each additional cigarette, give a dollar to your favorite randi. Change your eating habits to help you cut down. For example, drink milk, which many people consider incompatible with smoking. End meals or snacks with something that won't lead to a cigarette. Reach for a glass of juice instead of a cigarette for a \"pick-me-up. \"   Remember: Cutting down can help you quit, but it's not a substitute for quitting.  If yourself or someone else. Estimate the cost in terms of packs of cigarettes, and put the money aside to buy these presents. Keep very busy on the big day. Go to the movies, exercise, take long walks, or go bike riding. Remind your family and friends that this is your quit date, and ask them to help you over the rough spots of the first couple of days and weeks. Buy yourself a treat or do something special to celebrate. Telephone and Internet Support   Telephone, web-, and computer-based programs can offer you the support that you need to quit and to stay smoke-free. You can find many programs online, like the American [employer] Association's [place] from Smoking . Immediately After Quitting   Develop a clean, fresh, nonsmoking environment around yourselfat work and at home. Buy yourself flowersyou may be surprised how much you can enjoy their scent now. The first few days after you quit, spend as much free time as possible in places where smoking isn't allowed, such as [address] [place] [address], museums, theaters, department stores, and churches. Drink large quantities of water and fruit juice (but avoid sodas that contain caffeine). Try to avoid alcohol, coffee, and other beverages that you associate with cigarette smoking. Strike up conversation instead of a match for a cigarette. If you miss the sensation of having a cigarette in your hand, play with something elsea pencil, a paper clip, a marble. If you miss having something in your mouth, try toothpicks or a fake cigarette.

## 2022-06-28 NOTE — PROGRESS NOTES
Conjunctiva/sclera: Conjunctivae normal.      Pupils: Pupils are equal, round, and reactive to light. Neck:      Thyroid: No thyromegaly. Vascular: No JVD. Cardiovascular:      Rate and Rhythm: Normal rate and regular rhythm. Heart sounds: No murmur heard. No friction rub. No gallop. Pulmonary:      Effort: Pulmonary effort is normal. No respiratory distress. Breath sounds: Normal breath sounds. No wheezing or rales. Abdominal:      General: Bowel sounds are normal. There is no distension. Palpations: Abdomen is soft. Tenderness: There is no abdominal tenderness. There is no guarding. Musculoskeletal:         General: No tenderness. Cervical back: Normal range of motion and neck supple. Skin:     General: Skin is warm and dry. Findings: No rash. Neurological:      Mental Status: He is alert and oriented to person, place, and time.    Psychiatric:         Judgment: Judgment normal.       Results in Past 30 Days  Result Component Current Result Ref Range Previous Result Ref Range   Albumin 4.4 (6/29/2022)  Not in Time Range    Alkaline Phosphatase 96 (6/29/2022) U/L Not in Time Range    ALT 30 (6/29/2022) U/L Not in Time Range    AST 22 (6/29/2022) U/L Not in Time Range    BUN 23 (6/29/2022) mg/dL Not in Time Range    Calcium 9.5 (6/29/2022) mg/dL Not in Time Range    Chloride 104 (6/29/2022) mmol/L Not in Time Range    CO2 26.2 (6/29/2022) mmol/L Not in Time Range    Creatinine 0.95 (6/29/2022)  Not in Time Range    Gfr Calculated 91.1 (6/29/2022)  Not in Time Range    Glucose 94 (6/29/2022) mg/dL Not in Time Range    Potassium 4.6 (6/29/2022) mmol/L Not in Time Range    Sodium 139 (6/29/2022) mmol/L Not in Time Range    Total Bilirubin 0.5 (6/29/2022) 0.1 - 1.4 mg/dL Not in Time Range        LDL Calculated (mg/dL)   Date Value   06/29/2022 67           Lab Results   Component Value Date    TSH 3.050 06/29/2022         ASSESSMENT/PLAN:     Dion Garland was seen today for hypertension and hyperlipidemia. Diagnoses and all orders for this visit:    Irregular heart beat  -     CBC; Future  -     Comprehensive Metabolic Panel; Future  -     Holter Monitor 48 Hour  -     External Referral To Cardiology    Pure hypercholesterolemia  -     Lipid Panel; Future    Fatigue, unspecified type  -     CBC; Future  -     Comprehensive Metabolic Panel; Future  -     TSH; Future  -     Testosterone, free, total; Future    Vitamin D deficiency  -     Vitamin B12 & Folate; Future  -     Vitamin D 25 Hydroxy;  Future    Medications Discontinued During This Encounter   Medication Reason    losartan (COZAAR) 25 MG tablet DISCONTINUED BY ANOTHER CLINICIAN    diclofenac sodium (VOLTAREN) 1 % GEL Therapy completed    hydrOXYzine (ATARAX) 25 MG tablet Therapy completed    triamcinolone (KENALOG) 0.1 % cream Therapy completed    vitamin D (ERGOCALCIFEROL) 07304 units CAPS capsule Therapy completed

## 2022-06-29 ENCOUNTER — TRANSCRIBE ORDERS (OUTPATIENT)
Dept: LAB | Facility: HOSPITAL | Age: 62
End: 2022-06-29

## 2022-06-29 ENCOUNTER — LAB (OUTPATIENT)
Dept: LAB | Facility: HOSPITAL | Age: 62
End: 2022-06-29

## 2022-06-29 DIAGNOSIS — E78.00 PURE HYPERCHOLESTEROLEMIA: ICD-10-CM

## 2022-06-29 DIAGNOSIS — E55.9 VITAMIN D DEFICIENCY: ICD-10-CM

## 2022-06-29 DIAGNOSIS — R53.83 TIREDNESS: Primary | ICD-10-CM

## 2022-06-29 DIAGNOSIS — I49.9 IRREGULAR HEARTBEAT: ICD-10-CM

## 2022-06-29 DIAGNOSIS — R53.83 TIREDNESS: ICD-10-CM

## 2022-06-29 LAB
25(OH)D3 SERPL-MCNC: 27.3 NG/ML (ref 30–100)
ALBUMIN SERPL-MCNC: 4.4 G/DL
ALBUMIN SERPL-MCNC: 4.4 G/DL (ref 3.5–5.2)
ALBUMIN/GLOB SERPL: 1.6 G/DL
ALP BLD-CCNC: 96 U/L
ALP SERPL-CCNC: 96 U/L (ref 39–117)
ALT SERPL W P-5'-P-CCNC: 30 U/L (ref 1–41)
ALT SERPL-CCNC: 30 U/L
ANION GAP SERPL CALCULATED.3IONS-SCNC: 8.8 MMOL/L
ANION GAP SERPL CALCULATED.3IONS-SCNC: 8.8 MMOL/L (ref 5–15)
AST SERPL-CCNC: 22 U/L
AST SERPL-CCNC: 22 U/L (ref 1–40)
BASOPHILS ABSOLUTE: NORMAL
BASOPHILS RELATIVE PERCENT: NORMAL
BILIRUB SERPL-MCNC: 0.5 MG/DL (ref 0.1–1.4)
BILIRUB SERPL-MCNC: 0.5 MG/DL (ref 0–1.2)
BUN BLDV-MCNC: 23 MG/DL
BUN SERPL-MCNC: 23 MG/DL (ref 8–23)
BUN/CREAT SERPL: 24.2 (ref 7–25)
CALCIUM SERPL-MCNC: 9.5 MG/DL
CALCIUM SPEC-SCNC: 9.5 MG/DL (ref 8.6–10.5)
CHLORIDE BLD-SCNC: 104 MMOL/L
CHLORIDE SERPL-SCNC: 104 MMOL/L (ref 98–107)
CHOLEST SERPL-MCNC: 117 MG/DL (ref 0–200)
CHOLESTEROL, TOTAL: 117 MG/DL
CHOLESTEROL/HDL RATIO: 2.28
CO2 SERPL-SCNC: 26.2 MMOL/L (ref 22–29)
CO2: 26.2 MMOL/L
CREAT SERPL-MCNC: 0.95 MG/DL
CREAT SERPL-MCNC: 0.95 MG/DL (ref 0.76–1.27)
DEPRECATED RDW RBC AUTO: 39.8 FL (ref 37–54)
EGFRCR SERPLBLD CKD-EPI 2021: 91.1 ML/MIN/1.73
EOSINOPHILS ABSOLUTE: NORMAL
EOSINOPHILS RELATIVE PERCENT: NORMAL
ERYTHROCYTE [DISTWIDTH] IN BLOOD BY AUTOMATED COUNT: 13 % (ref 12.3–15.4)
FOLATE SERPL-MCNC: 14 NG/ML (ref 4.78–24.2)
FOLATE: 14
GFR CALCULATED: 91.1
GLOBULIN UR ELPH-MCNC: 2.8 GM/DL
GLUCOSE BLD-MCNC: 94 MG/DL
GLUCOSE SERPL-MCNC: 94 MG/DL (ref 65–99)
HCT VFR BLD AUTO: 41.7 % (ref 37.5–51)
HCT VFR BLD CALC: 41.7 % (ref 41–53)
HDLC SERPL-MCNC: 29 MG/DL (ref 35–70)
HDLC SERPL-MCNC: 29 MG/DL (ref 40–60)
HEMOGLOBIN: 14.6 G/DL (ref 13.5–17.5)
HGB BLD-MCNC: 14.6 G/DL (ref 13–17.7)
LDL CHOLESTEROL CALCULATED: 67 MG/DL (ref 0–160)
LDLC SERPL CALC-MCNC: 67 MG/DL (ref 0–100)
LDLC/HDLC SERPL: 2.28 {RATIO}
LYMPHOCYTES ABSOLUTE: NORMAL
LYMPHOCYTES RELATIVE PERCENT: NORMAL
MCH RBC QN AUTO: 30.3 PG
MCH RBC QN AUTO: 30.3 PG (ref 26.6–33)
MCHC RBC AUTO-ENTMCNC: 35 G/DL
MCHC RBC AUTO-ENTMCNC: 35 G/DL (ref 31.5–35.7)
MCV RBC AUTO: 86.5 FL
MCV RBC AUTO: 86.5 FL (ref 79–97)
MONOCYTES ABSOLUTE: NORMAL
MONOCYTES RELATIVE PERCENT: NORMAL
NEUTROPHILS ABSOLUTE: NORMAL
NEUTROPHILS RELATIVE PERCENT: NORMAL
NONHDLC SERPL-MCNC: ABNORMAL MG/DL
PLATELET # BLD AUTO: 227 10*3/MM3 (ref 140–450)
PLATELET # BLD: 227 K/ΜL
PMV BLD AUTO: 11 FL
PMV BLD AUTO: 11 FL (ref 6–12)
POTASSIUM SERPL-SCNC: 4.6 MMOL/L
POTASSIUM SERPL-SCNC: 4.6 MMOL/L (ref 3.5–5.2)
PROT SERPL-MCNC: 7.2 G/DL (ref 6–8.5)
RBC # BLD AUTO: 4.82 10*6/MM3 (ref 4.14–5.8)
RBC # BLD: 4.82 10^6/ΜL
SEX HORMONE BINDING GLOBULIN: ABNORMAL
SODIUM BLD-SCNC: 139 MMOL/L
SODIUM SERPL-SCNC: 139 MMOL/L (ref 136–145)
TESTOSTERONE FREE: 3.6
TESTOSTERONE TOTAL: 371
TESTOSTERONE, BIOAVAILABLE: ABNORMAL
TOTAL PROTEIN: 7.2
TRIGL SERPL-MCNC: 110 MG/DL
TRIGL SERPL-MCNC: 110 MG/DL (ref 0–150)
TSH SERPL DL<=0.05 MIU/L-ACNC: 3.05 UIU/ML
TSH SERPL DL<=0.05 MIU/L-ACNC: 3.05 UIU/ML (ref 0.27–4.2)
VIT B12 BLD-MCNC: 395 PG/ML (ref 211–946)
VITAMIN B-12: 395
VITAMIN D 25-HYDROXY: 27.3
VITAMIN D2, 25 HYDROXY: NORMAL
VITAMIN D3,25 HYDROXY: NORMAL
VLDLC SERPL CALC-MCNC: 21 MG/DL
VLDLC SERPL-MCNC: 21 MG/DL (ref 5–40)
WBC # BLD: 5.69 10^3/ML
WBC NRBC COR # BLD: 5.69 10*3/MM3 (ref 3.4–10.8)

## 2022-06-29 PROCEDURE — 84403 ASSAY OF TOTAL TESTOSTERONE: CPT

## 2022-06-29 PROCEDURE — 80061 LIPID PANEL: CPT

## 2022-06-29 PROCEDURE — 82607 VITAMIN B-12: CPT

## 2022-06-29 PROCEDURE — 80050 GENERAL HEALTH PANEL: CPT

## 2022-06-29 PROCEDURE — 82746 ASSAY OF FOLIC ACID SERUM: CPT

## 2022-06-29 PROCEDURE — 36415 COLL VENOUS BLD VENIPUNCTURE: CPT

## 2022-06-29 PROCEDURE — 82306 VITAMIN D 25 HYDROXY: CPT

## 2022-06-29 PROCEDURE — 84402 ASSAY OF FREE TESTOSTERONE: CPT

## 2022-06-30 DIAGNOSIS — E78.00 PURE HYPERCHOLESTEROLEMIA: ICD-10-CM

## 2022-06-30 DIAGNOSIS — E55.9 VITAMIN D DEFICIENCY: ICD-10-CM

## 2022-06-30 DIAGNOSIS — I49.9 IRREGULAR HEART BEAT: ICD-10-CM

## 2022-06-30 DIAGNOSIS — R53.83 FATIGUE, UNSPECIFIED TYPE: ICD-10-CM

## 2022-07-02 LAB
TESTOST FREE SERPL-MCNC: 3.6 PG/ML (ref 6.6–18.1)
TESTOST SERPL-MCNC: 371 NG/DL (ref 264–916)

## 2022-07-05 DIAGNOSIS — R53.83 FATIGUE, UNSPECIFIED TYPE: ICD-10-CM

## 2022-07-06 ENCOUNTER — HOSPITAL ENCOUNTER (OUTPATIENT)
Dept: RESPIRATORY THERAPY | Facility: HOSPITAL | Age: 62
Discharge: HOME | End: 2022-07-06
Payer: COMMERCIAL

## 2022-07-06 PROCEDURE — 93225 XTRNL ECG REC<48 HRS REC: CPT

## 2022-07-25 DIAGNOSIS — R00.2 PALPITATIONS: Primary | ICD-10-CM

## 2022-08-10 DIAGNOSIS — M19.079 ARTHRITIS OF BIG TOE: ICD-10-CM

## 2022-08-10 RX ORDER — MELOXICAM 15 MG/1
TABLET ORAL
Qty: 90 TABLET | Refills: 1 | Status: SHIPPED | OUTPATIENT
Start: 2022-08-10

## 2022-08-14 DIAGNOSIS — G25.81 RLS (RESTLESS LEGS SYNDROME): ICD-10-CM

## 2022-08-15 RX ORDER — ROPINIROLE 0.25 MG/1
TABLET, FILM COATED ORAL
Qty: 60 TABLET | Refills: 2 | Status: SHIPPED | OUTPATIENT
Start: 2022-08-15

## 2022-08-30 ENCOUNTER — OFFICE VISIT (OUTPATIENT)
Dept: PRIMARY CARE CLINIC | Age: 62
End: 2022-08-30
Payer: COMMERCIAL

## 2022-08-30 VITALS
HEART RATE: 75 BPM | OXYGEN SATURATION: 96 % | TEMPERATURE: 98 F | BODY MASS INDEX: 35.58 KG/M2 | RESPIRATION RATE: 12 BRPM | DIASTOLIC BLOOD PRESSURE: 78 MMHG | HEIGHT: 68 IN | WEIGHT: 234.8 LBS | SYSTOLIC BLOOD PRESSURE: 134 MMHG

## 2022-08-30 DIAGNOSIS — I10 PRIMARY HYPERTENSION: ICD-10-CM

## 2022-08-30 DIAGNOSIS — H90.0 CONDUCTIVE HEARING LOSS, BILATERAL: ICD-10-CM

## 2022-08-30 DIAGNOSIS — I47.1 ATRIAL TACHYCARDIA (HCC): Primary | ICD-10-CM

## 2022-08-30 DIAGNOSIS — H93.13 BILATERAL TINNITUS: ICD-10-CM

## 2022-08-30 PROCEDURE — 99214 OFFICE O/P EST MOD 30 MIN: CPT | Performed by: NURSE PRACTITIONER

## 2022-08-30 RX ORDER — NEBIVOLOL 2.5 MG/1
2.5 TABLET ORAL DAILY
Qty: 90 TABLET | Refills: 2 | Status: SHIPPED | OUTPATIENT
Start: 2022-08-30

## 2022-08-30 NOTE — PROGRESS NOTES
Chief Complaint   Patient presents with    Hypertension    Irregular Heart Beat       Have you seen any other physician or provider since your last visit no    Have you had any other diagnostic tests since your last visit? no    Have you changed or stopped any medications since your last visit? no     I have recommended that this patient have a sigmoidoscopy but he due to refusal reason: no symptoms  I have discussed the risks and benefits of this examination with him. The patient verbalizes understanding. Provider will be informed of refusal.      Diabetic retinal exam completed this year?  Yes                       * If yes please have patient sign a records release to obtain record to update Health Maintenance                       * If no, please order referral for patient to be scheduled     Patient here to follow up after recent holter moniter

## 2022-08-30 NOTE — PROGRESS NOTES
SUBJECTIVE:    Patient ID: Shiraz Chawla is a 65 y.o. male. Chief Complaint   Patient presents with    Hypertension    Irregular Heart Beat         HPI:  He is following on his heart palpitations and Holter monitor. He states that he still has some issues with some heart palpitations from time to time. He continues on his blood pressure medication without any complaints or issues. He has had any syncopal episodes. He does complain of hearing issues and ringing in his ears its been going on for several years but much worse in the last few months. He has not been to a hearing specialist.  He denies any problems with balance or vision. Patient's medications,allergies, past medical, surgical, social and family histories were reviewed and updated as appropriate. .  Current Outpatient Medications on File Prior to Visit   Medication Sig Dispense Refill    rOPINIRole (REQUIP) 0.25 MG tablet Take 1 tablet by mouth Every Night. Take 1 hour before bedtime. May increase to 2 tablets Every Night as needed 60 tablet 2    meloxicam (MOBIC) 15 MG tablet Take 1 tablet by mouth Daily. 90 tablet 1    losartan (COZAAR) 50 MG tablet Take 1 tablet by mouth Daily. 30 tablet 3    pravastatin (PRAVACHOL) 20 MG tablet Take 1 tablet by mouth Daily. 30 tablet 5    pramipexole (MIRAPEX) 0.25 MG tablet Take 1 tablet by mouth nightly 90 tablet 3     No current facility-administered medications on file prior to visit. Review of Systems   Constitutional: Negative. HENT: Negative. Eyes: Negative. Respiratory: Negative. Cardiovascular:  Positive for palpitations. Gastrointestinal: Negative. Genitourinary: Negative. Musculoskeletal: Negative. Skin: Negative. Neurological: Negative. Psychiatric/Behavioral: Negative.        OBJECTIVE:  /78 (Site: Left Upper Arm, Position: Sitting, Cuff Size: Medium Adult)   Pulse 75   Temp 98 °F (36.7 °C) (Temporal)   Resp 12   Ht 5' 8\" (1.727 m)   Wt 234 lb 12.8 oz (106.5 kg)   SpO2 96%   BMI 35.70 kg/m²    Physical Exam  Vitals and nursing note reviewed. Constitutional:       Appearance: He is well-developed. HENT:      Head: Normocephalic and atraumatic. Eyes:      Conjunctiva/sclera: Conjunctivae normal.      Pupils: Pupils are equal, round, and reactive to light. Neck:      Thyroid: No thyromegaly. Vascular: No JVD. Cardiovascular:      Rate and Rhythm: Normal rate and regular rhythm. Heart sounds: No murmur heard. No friction rub. No gallop. Pulmonary:      Effort: Pulmonary effort is normal. No respiratory distress. Breath sounds: Normal breath sounds. No wheezing or rales. Abdominal:      General: Bowel sounds are normal. There is no distension. Palpations: Abdomen is soft. Tenderness: There is no abdominal tenderness. There is no guarding. Musculoskeletal:         General: No tenderness. Cervical back: Normal range of motion and neck supple. Skin:     General: Skin is warm and dry. Findings: No rash. Neurological:      Mental Status: He is alert and oriented to person, place, and time. Psychiatric:         Judgment: Judgment normal.       No results found for requested labs within last 30 days. LDL Calculated (mg/dL)   Date Value   06/29/2022 67           Lab Results   Component Value Date    TSH 3.050 06/29/2022         ASSESSMENT/PLAN:     Janiya Eduardo was seen today for hypertension and irregular heart beat. Diagnoses and all orders for this visit:    Atrial tachycardia ([address]  -     nebivolol (BYSTOLIC) 2.5 MG tablet; Take 1 tablet by mouth daily  Start on medication and monitor heart rate. -     External Referral To Cardiology    Conductive hearing loss, bilateral  -     External Referral To ENT    Bilateral tinnitus  -     External Referral To ENT    Primary hypertension  Continue Cozaar 50 mg 1 p.o. daily. There are no discontinued medications.

## 2022-09-25 ASSESSMENT — ENCOUNTER SYMPTOMS
EYES NEGATIVE: 1
GASTROINTESTINAL NEGATIVE: 1
RESPIRATORY NEGATIVE: 1

## 2022-10-04 ENCOUNTER — TRANSCRIBE ORDERS (OUTPATIENT)
Dept: ADMINISTRATIVE | Facility: HOSPITAL | Age: 62
End: 2022-10-04

## 2022-10-04 DIAGNOSIS — J32.9 CHRONIC SINUSITIS, UNSPECIFIED LOCATION: Primary | ICD-10-CM

## 2022-10-20 DIAGNOSIS — I10 PRIMARY HYPERTENSION: ICD-10-CM

## 2022-10-20 RX ORDER — LOSARTAN POTASSIUM 50 MG/1
50 TABLET ORAL DAILY
Qty: 30 TABLET | Refills: 3 | Status: SHIPPED | OUTPATIENT
Start: 2022-10-20

## 2022-10-21 ENCOUNTER — HOSPITAL ENCOUNTER (OUTPATIENT)
Dept: CT IMAGING | Facility: HOSPITAL | Age: 62
Discharge: HOME | End: 2022-10-21
Admitting: OTOLARYNGOLOGY

## 2022-10-21 DIAGNOSIS — J32.9 CHRONIC SINUSITIS, UNSPECIFIED LOCATION: ICD-10-CM

## 2022-10-21 PROCEDURE — 70486 CT MAXILLOFACIAL W/O DYE: CPT

## 2022-12-19 ENCOUNTER — OFFICE VISIT (OUTPATIENT)
Dept: PRIMARY CARE CLINIC | Age: 62
End: 2022-12-19

## 2022-12-19 VITALS — HEART RATE: 67 BPM | OXYGEN SATURATION: 97 % | TEMPERATURE: 97.4 F

## 2022-12-19 DIAGNOSIS — J02.9 SORE THROAT: ICD-10-CM

## 2022-12-19 DIAGNOSIS — E78.00 PURE HYPERCHOLESTEROLEMIA: ICD-10-CM

## 2022-12-19 DIAGNOSIS — J02.0 STREPTOCOCCAL PHARYNGITIS: Primary | ICD-10-CM

## 2022-12-19 LAB — S PYO AG THROAT QL: POSITIVE

## 2022-12-19 RX ORDER — CLARITHROMYCIN 250 MG/1
250 TABLET, FILM COATED ORAL 2 TIMES DAILY
Qty: 20 TABLET | Refills: 0 | Status: SHIPPED | OUTPATIENT
Start: 2022-12-19 | End: 2022-12-29

## 2022-12-19 NOTE — PROGRESS NOTES
Chief Complaint   Patient presents with    Pharyngitis     Patient complains of a sore throat and a cough productive of some yellow sputum.

## 2022-12-19 NOTE — PROGRESS NOTES
SUBJECTIVE:    Patient ID: Charlie Wilde is a 65 y.o.male. Chief Complaint   Patient presents with    Pharyngitis         HPI:    Pt c/o sore throat few days. Headache as well. No flu or covid exposure. Had strep several years ago. No fever, body aches, cough. No relieving or worsening factors. No treatments. Patient's medications, allergies, past medical, surgical, social and family histories were reviewed and updated as appropriate in electronic medical record. No outpatient medications have been marked as taking for the 12/19/22 encounter (Office Visit) with EULA Yao CNP. Review of Systems   Constitutional:  Positive for fatigue. HENT:  Positive for sore throat. Neurological:  Positive for headaches. All other systems reviewed and are negative. Past Medical History:   Diagnosis Date    Hyperlipidemia     Osteoarthritis     RLS (restless legs syndrome)     Vitamin D deficiency      Past Surgical History:   Procedure Laterality Date    LEG SURGERY       Family History   Problem Relation Age of Onset    COPD Father       Social History     Tobacco Use   Smoking Status Never   Smokeless Tobacco Current    Types: Chew       OBJECTIVE:   Wt Readings from Last 3 Encounters:   08/30/22 234 lb 12.8 oz (106.5 kg)   06/28/22 230 lb (104.3 kg)   10/22/21 234 lb (106.1 kg)     BP Readings from Last 3 Encounters:   08/30/22 134/78   06/28/22 130/78   10/22/21 (!) 148/81       Pulse 67   Temp 97.4 °F (36.3 °C)   SpO2 97%      Physical Exam  Vitals and nursing note reviewed. Constitutional:       Appearance: Normal appearance. HENT:      Head: Normocephalic. Right Ear: External ear normal.      Left Ear: External ear normal.      Nose: Nose normal.      Mouth/Throat:      Pharynx: Oropharyngeal exudate and posterior oropharyngeal erythema present.    Eyes:      Conjunctiva/sclera: Conjunctivae normal.   Cardiovascular:      Rate and Rhythm: Normal rate and regular rhythm. Pulmonary:      Effort: Pulmonary effort is normal.      Breath sounds: Normal breath sounds. Neurological:      General: No focal deficit present. Mental Status: He is alert and oriented to person, place, and time. Psychiatric:         Mood and Affect: Mood normal.         Behavior: Behavior normal.       Lab Results   Component Value Date/Time     06/29/2022 12:00 AM    K 4.6 06/29/2022 12:00 AM    K 4.5 10/04/2021 08:50 AM     06/29/2022 12:00 AM    CO2 26.2 06/29/2022 12:00 AM    GLUCOSE 94 06/29/2022 12:00 AM    BUN 23 06/29/2022 12:00 AM    CREATININE 0.95 06/29/2022 12:00 AM    CALCIUM 9.5 06/29/2022 12:00 AM    PROT 7.3 04/02/2018 09:20 AM    LABALBU 4.4 06/29/2022 12:00 AM    BILITOT 0.5 06/29/2022 12:00 AM    ALT 30 06/29/2022 12:00 AM    AST 22 06/29/2022 12:00 AM       LDL Calculated (mg/dL)   Date Value   06/29/2022 67         Lab Results   Component Value Date/Time    WBC 5.69 06/29/2022 12:00 AM    NEUTROABS 3.3 10/04/2021 08:50 AM    HGB 14.6 06/29/2022 12:00 AM    HCT 41.7 06/29/2022 12:00 AM    MCV 86.5 06/29/2022 12:00 AM     06/29/2022 12:00 AM       Lab Results   Component Value Date    TSH 3.050 06/29/2022         ASSESSMENT/PLAN:     1. Streptococcal pharyngitis  Take medications as prescribed. Continue home medications. Discussed symptom management. Return to clinic S&S worsen or no improvement noted. Patient verbalized understanding.       2. Sore throat  +strep test. See 1  - POCT rapid strep A      Orders Placed This Encounter   Medications    clarithromycin (BIAXIN) 250 MG tablet     Sig: Take 1 tablet by mouth 2 times daily for 10 days     Dispense:  20 tablet     Refill:  0

## 2022-12-20 RX ORDER — PRAVASTATIN SODIUM 20 MG
TABLET ORAL
Qty: 30 TABLET | Refills: 5 | Status: SHIPPED | OUTPATIENT
Start: 2022-12-20

## 2023-02-06 DIAGNOSIS — G25.81 RLS (RESTLESS LEGS SYNDROME): ICD-10-CM

## 2023-02-06 DIAGNOSIS — M19.079 ARTHRITIS OF BIG TOE: ICD-10-CM

## 2023-02-06 RX ORDER — MELOXICAM 15 MG/1
TABLET ORAL
Qty: 90 TABLET | Refills: 1 | Status: SHIPPED | OUTPATIENT
Start: 2023-02-06

## 2023-02-06 RX ORDER — ROPINIROLE 0.25 MG/1
TABLET, FILM COATED ORAL
Qty: 60 TABLET | Refills: 2 | Status: SHIPPED | OUTPATIENT
Start: 2023-02-06

## 2023-02-19 DIAGNOSIS — I10 PRIMARY HYPERTENSION: ICD-10-CM

## 2023-02-20 RX ORDER — LOSARTAN POTASSIUM 50 MG/1
50 TABLET ORAL DAILY
Qty: 30 TABLET | Refills: 3 | Status: SHIPPED | OUTPATIENT
Start: 2023-02-20

## 2023-05-24 DIAGNOSIS — I47.1 ATRIAL TACHYCARDIA (HCC): ICD-10-CM

## 2023-05-26 RX ORDER — NEBIVOLOL 2.5 MG/1
2.5 TABLET ORAL DAILY
Qty: 90 TABLET | Refills: 2 | Status: SHIPPED | OUTPATIENT
Start: 2023-05-26

## 2023-06-26 DIAGNOSIS — E78.00 PURE HYPERCHOLESTEROLEMIA: ICD-10-CM

## 2023-06-26 DIAGNOSIS — I10 PRIMARY HYPERTENSION: ICD-10-CM

## 2023-06-26 RX ORDER — PRAVASTATIN SODIUM 20 MG
TABLET ORAL
Qty: 30 TABLET | Refills: 5 | Status: SHIPPED | OUTPATIENT
Start: 2023-06-26

## 2023-06-26 RX ORDER — LOSARTAN POTASSIUM 50 MG/1
50 TABLET ORAL DAILY
Qty: 30 TABLET | Refills: 3 | Status: SHIPPED | OUTPATIENT
Start: 2023-06-26

## 2023-06-28 ENCOUNTER — TELEPHONE (OUTPATIENT)
Dept: PRIMARY CARE CLINIC | Age: 63
End: 2023-06-28

## 2023-06-28 DIAGNOSIS — Z12.11 COLON CANCER SCREENING: Primary | ICD-10-CM

## 2023-06-30 ENCOUNTER — OFFICE VISIT (OUTPATIENT)
Dept: PRIMARY CARE CLINIC | Age: 63
End: 2023-06-30
Payer: COMMERCIAL

## 2023-06-30 VITALS
WEIGHT: 237 LBS | HEART RATE: 57 BPM | BODY MASS INDEX: 35.92 KG/M2 | TEMPERATURE: 96.9 F | HEIGHT: 68 IN | SYSTOLIC BLOOD PRESSURE: 137 MMHG | DIASTOLIC BLOOD PRESSURE: 69 MMHG | OXYGEN SATURATION: 97 %

## 2023-06-30 DIAGNOSIS — E55.9 VITAMIN D DEFICIENCY: ICD-10-CM

## 2023-06-30 DIAGNOSIS — R42 DIZZINESS: Primary | ICD-10-CM

## 2023-06-30 DIAGNOSIS — Z13.29 THYROID DISORDER SCREEN: ICD-10-CM

## 2023-06-30 DIAGNOSIS — R00.1 BRADYCARDIA: ICD-10-CM

## 2023-06-30 DIAGNOSIS — I10 PRIMARY HYPERTENSION: ICD-10-CM

## 2023-06-30 DIAGNOSIS — Z13.1 DIABETES MELLITUS SCREENING: ICD-10-CM

## 2023-06-30 DIAGNOSIS — Z12.11 COLON CANCER SCREENING: ICD-10-CM

## 2023-06-30 LAB
AVERAGE GLUCOSE: NORMAL
HBA1C MFR BLD: 5.5 %

## 2023-06-30 PROCEDURE — 3078F DIAST BP <80 MM HG: CPT | Performed by: NURSE PRACTITIONER

## 2023-06-30 PROCEDURE — 99214 OFFICE O/P EST MOD 30 MIN: CPT | Performed by: NURSE PRACTITIONER

## 2023-06-30 PROCEDURE — 3075F SYST BP GE 130 - 139MM HG: CPT | Performed by: NURSE PRACTITIONER

## 2023-06-30 RX ORDER — MECLIZINE HYDROCHLORIDE 25 MG/1
25 TABLET ORAL 3 TIMES DAILY PRN
Qty: 30 TABLET | Refills: 0 | Status: SHIPPED | OUTPATIENT
Start: 2023-06-30 | End: 2023-07-10

## 2023-06-30 SDOH — ECONOMIC STABILITY: HOUSING INSECURITY
IN THE LAST 12 MONTHS, WAS THERE A TIME WHEN YOU DID NOT HAVE A STEADY PLACE TO SLEEP OR SLEPT IN A SHELTER (INCLUDING NOW)?: NO

## 2023-06-30 SDOH — ECONOMIC STABILITY: FOOD INSECURITY: WITHIN THE PAST 12 MONTHS, YOU WORRIED THAT YOUR FOOD WOULD RUN OUT BEFORE YOU GOT MONEY TO BUY MORE.: NEVER TRUE

## 2023-06-30 SDOH — ECONOMIC STABILITY: FOOD INSECURITY: WITHIN THE PAST 12 MONTHS, THE FOOD YOU BOUGHT JUST DIDN'T LAST AND YOU DIDN'T HAVE MONEY TO GET MORE.: NEVER TRUE

## 2023-06-30 SDOH — ECONOMIC STABILITY: INCOME INSECURITY: HOW HARD IS IT FOR YOU TO PAY FOR THE VERY BASICS LIKE FOOD, HOUSING, MEDICAL CARE, AND HEATING?: NOT HARD AT ALL

## 2023-06-30 ASSESSMENT — PATIENT HEALTH QUESTIONNAIRE - PHQ9
SUM OF ALL RESPONSES TO PHQ QUESTIONS 1-9: 0
SUM OF ALL RESPONSES TO PHQ QUESTIONS 1-9: 0
1. LITTLE INTEREST OR PLEASURE IN DOING THINGS: 0
SUM OF ALL RESPONSES TO PHQ QUESTIONS 1-9: 0
SUM OF ALL RESPONSES TO PHQ QUESTIONS 1-9: 0
SUM OF ALL RESPONSES TO PHQ9 QUESTIONS 1 & 2: 0
2. FEELING DOWN, DEPRESSED OR HOPELESS: 0

## 2023-06-30 ASSESSMENT — ENCOUNTER SYMPTOMS
RESPIRATORY NEGATIVE: 1
NAUSEA: 1
EYES NEGATIVE: 1

## 2023-07-05 DIAGNOSIS — Z13.1 DIABETES MELLITUS SCREENING: ICD-10-CM

## 2023-07-05 DIAGNOSIS — Z13.29 THYROID DISORDER SCREEN: ICD-10-CM

## 2023-07-05 DIAGNOSIS — R00.1 BRADYCARDIA: ICD-10-CM

## 2023-07-05 DIAGNOSIS — E55.9 VITAMIN D DEFICIENCY: ICD-10-CM

## 2023-07-28 ENCOUNTER — HOSPITAL ENCOUNTER (OUTPATIENT)
Dept: CARDIOLOGY | Facility: HOSPITAL | Age: 63
Discharge: HOME | End: 2023-07-28
Payer: COMMERCIAL

## 2023-07-28 VITALS — BODY MASS INDEX: 31.65 KG/M2 | WEIGHT: 233.69 LBS | HEIGHT: 72 IN

## 2023-07-28 VITALS
SYSTOLIC BLOOD PRESSURE: 130 MMHG | BODY MASS INDEX: 31.65 KG/M2 | HEIGHT: 72 IN | OXYGEN SATURATION: 94 % | HEART RATE: 45 BPM | WEIGHT: 233.69 LBS | DIASTOLIC BLOOD PRESSURE: 70 MMHG

## 2023-07-28 DIAGNOSIS — R06.09 DYSPNEA ON EXERTION: ICD-10-CM

## 2023-07-28 LAB
BH CV ECHO MEAS - AO MAX PG: 9.7 MMHG
BH CV ECHO MEAS - AO MEAN PG: 5 MMHG
BH CV ECHO MEAS - AO ROOT DIAM: 3.4 CM
BH CV ECHO MEAS - AO V2 MAX: 156 CM/SEC
BH CV ECHO MEAS - AO V2 VTI: 39.5 CM
BH CV ECHO MEAS - AVA(I,D): 2.19 CM2
BH CV ECHO MEAS - EDV(CUBED): 84.6 ML
BH CV ECHO MEAS - EDV(MOD-SP2): 116 ML
BH CV ECHO MEAS - EDV(MOD-SP4): 99 ML
BH CV ECHO MEAS - EF(MOD-BP): 63 %
BH CV ECHO MEAS - EF(MOD-SP2): 64.7 %
BH CV ECHO MEAS - EF(MOD-SP4): 62.6 %
BH CV ECHO MEAS - ESV(CUBED): 27 ML
BH CV ECHO MEAS - ESV(MOD-SP2): 41 ML
BH CV ECHO MEAS - ESV(MOD-SP4): 37 ML
BH CV ECHO MEAS - FS: 31.7 %
BH CV ECHO MEAS - IVS/LVPW: 1.1 CM
BH CV ECHO MEAS - IVSD: 1.48 CM
BH CV ECHO MEAS - LA DIMENSION: 4.1 CM
BH CV ECHO MEAS - LAT PEAK E' VEL: 9.7 CM/SEC
BH CV ECHO MEAS - LV DIASTOLIC VOL/BSA (35-75): 43.5 CM2
BH CV ECHO MEAS - LV MASS(C)D: 243.3 GRAMS
BH CV ECHO MEAS - LV MAX PG: 4 MMHG
BH CV ECHO MEAS - LV MEAN PG: 2 MMHG
BH CV ECHO MEAS - LV SYSTOLIC VOL/BSA (12-30): 16.3 CM2
BH CV ECHO MEAS - LV V1 MAX: 99.6 CM/SEC
BH CV ECHO MEAS - LV V1 VTI: 25 CM
BH CV ECHO MEAS - LVIDD: 4.4 CM
BH CV ECHO MEAS - LVIDS: 3 CM
BH CV ECHO MEAS - LVOT AREA: 3.5 CM2
BH CV ECHO MEAS - LVOT DIAM: 2.1 CM
BH CV ECHO MEAS - LVPWD: 1.35 CM
BH CV ECHO MEAS - MED PEAK E' VEL: 6.84 CM/SEC
BH CV ECHO MEAS - MV A MAX VEL: 73.1 CM/SEC
BH CV ECHO MEAS - MV DEC SLOPE: 405 CM/SEC2
BH CV ECHO MEAS - MV DEC TIME: 0.26 MSEC
BH CV ECHO MEAS - MV E MAX VEL: 73.5 CM/SEC
BH CV ECHO MEAS - MV E/A: 1.01
BH CV ECHO MEAS - MV MAX PG: 3.1 MMHG
BH CV ECHO MEAS - MV MEAN PG: 1 MMHG
BH CV ECHO MEAS - MV P1/2T: 67.2 MSEC
BH CV ECHO MEAS - MV V2 VTI: 35.7 CM
BH CV ECHO MEAS - MVA(P1/2T): 3.3 CM2
BH CV ECHO MEAS - MVA(VTI): 2.43 CM2
BH CV ECHO MEAS - PA ACC SLOPE: 521 CM/SEC2
BH CV ECHO MEAS - PA ACC TIME: 0.16 SEC
BH CV ECHO MEAS - PA V2 MAX: 103 CM/SEC
BH CV ECHO MEAS - PI END-D VEL: 79.6 CM/SEC
BH CV ECHO MEAS - RAP SYSTOLE: 8 MMHG
BH CV ECHO MEAS - RVSP: 29 MMHG
BH CV ECHO MEAS - SI(MOD-SP2): 33 ML/M2
BH CV ECHO MEAS - SI(MOD-SP4): 27.3 ML/M2
BH CV ECHO MEAS - SV(LVOT): 86.6 ML
BH CV ECHO MEAS - SV(MOD-SP2): 75 ML
BH CV ECHO MEAS - SV(MOD-SP4): 62 ML
BH CV ECHO MEAS - TAPSE (>1.6): 2.8 CM
BH CV ECHO MEAS - TR MAX PG: 21 MMHG
BH CV ECHO MEAS - TR MAX VEL: 229 CM/SEC
BH CV ECHO MEASUREMENTS AVERAGE E/E' RATIO: 8.89
BH CV XLRA - RV BASE: 3.7 CM
BH CV XLRA - RV LENGTH: 6.6 CM
BH CV XLRA - RV MID: 2.5 CM
BH CV XLRA - TDI S': 9.86 CM/SEC
LEFT ATRIUM VOLUME INDEX: 32.2 ML/M2

## 2023-07-28 PROCEDURE — 93306 TTE W/DOPPLER COMPLETE: CPT

## 2023-07-28 PROCEDURE — 0 TECHNETIUM SESTAMIBI: Performed by: HOSPITALIST

## 2023-07-28 PROCEDURE — 78452 HT MUSCLE IMAGE SPECT MULT: CPT

## 2023-07-28 PROCEDURE — 93306 TTE W/DOPPLER COMPLETE: CPT | Performed by: INTERNAL MEDICINE

## 2023-07-28 PROCEDURE — 93017 CV STRESS TEST TRACING ONLY: CPT

## 2023-07-28 PROCEDURE — A9500 TC99M SESTAMIBI: HCPCS | Performed by: HOSPITALIST

## 2023-07-28 RX ADMIN — TECHNETIUM TC 99M SESTAMIBI 1 DOSE: 1 INJECTION INTRAVENOUS at 08:15

## 2023-07-28 RX ADMIN — TECHNETIUM TC 99M SESTAMIBI 1 DOSE: 1 INJECTION INTRAVENOUS at 09:52

## 2023-07-31 ENCOUNTER — OFFICE VISIT (OUTPATIENT)
Dept: PRIMARY CARE CLINIC | Age: 63
End: 2023-07-31
Payer: COMMERCIAL

## 2023-07-31 VITALS
SYSTOLIC BLOOD PRESSURE: 134 MMHG | RESPIRATION RATE: 16 BRPM | OXYGEN SATURATION: 96 % | HEIGHT: 68 IN | HEART RATE: 60 BPM | WEIGHT: 232.2 LBS | DIASTOLIC BLOOD PRESSURE: 74 MMHG | TEMPERATURE: 98 F | BODY MASS INDEX: 35.19 KG/M2

## 2023-07-31 DIAGNOSIS — M19.079 ARTHRITIS OF BIG TOE: ICD-10-CM

## 2023-07-31 DIAGNOSIS — G25.81 RLS (RESTLESS LEGS SYNDROME): ICD-10-CM

## 2023-07-31 DIAGNOSIS — R06.02 SHORTNESS OF BREATH: Primary | ICD-10-CM

## 2023-07-31 LAB
BH CV REST NUCLEAR ISOTOPE DOSE: 9.9 MCI
BH CV STRESS BP STAGE 3: ABNORMAL
BH CV STRESS DURATION MIN STAGE 1: 3
BH CV STRESS DURATION MIN STAGE 2: 3
BH CV STRESS DURATION MIN STAGE 3: 3
BH CV STRESS DURATION SEC STAGE 1: 0
BH CV STRESS DURATION SEC STAGE 2: 0
BH CV STRESS DURATION SEC STAGE 3: 48
BH CV STRESS GRADE STAGE 1: 10
BH CV STRESS GRADE STAGE 2: 12
BH CV STRESS GRADE STAGE 3: 14
BH CV STRESS HR STAGE 1: 84
BH CV STRESS HR STAGE 2: 110
BH CV STRESS HR STAGE 3: 133
BH CV STRESS METS STAGE 1: 5
BH CV STRESS METS STAGE 2: 7.5
BH CV STRESS METS STAGE 3: 10
BH CV STRESS NUCLEAR ISOTOPE DOSE: 32.9 MCI
BH CV STRESS O2 STAGE 1: 100
BH CV STRESS O2 STAGE 2: 96
BH CV STRESS O2 STAGE 3: 96
BH CV STRESS PROTOCOL 1: ABNORMAL
BH CV STRESS RECOVERY BP: ABNORMAL MMHG
BH CV STRESS RECOVERY HR: 65 BPM
BH CV STRESS RECOVERY O2: 97 %
BH CV STRESS SPEED STAGE 1: 1.7
BH CV STRESS SPEED STAGE 2: 2.5
BH CV STRESS SPEED STAGE 3: 3.4
BH CV STRESS STAGE 1: 1
BH CV STRESS STAGE 2: 2
BH CV STRESS STAGE 3: 3
LV EF NUC BP: 67 %
MAXIMAL PREDICTED HEART RATE: 158 BPM
PERCENT MAX PREDICTED HR: 84.81 %
STRESS BASELINE BP: ABNORMAL MMHG
STRESS BASELINE HR: 43 BPM
STRESS O2 SAT REST: 94 %
STRESS PERCENT HR: 100 %
STRESS POST ESTIMATED WORKLOAD: 10.1 METS
STRESS POST EXERCISE DUR MIN: 9 MIN
STRESS POST EXERCISE DUR SEC: 48 SEC
STRESS POST O2 SAT PEAK: 96 %
STRESS POST PEAK BP: ABNORMAL MMHG
STRESS POST PEAK HR: 134 BPM
STRESS TARGET HR: 134 BPM

## 2023-07-31 PROCEDURE — 99214 OFFICE O/P EST MOD 30 MIN: CPT | Performed by: NURSE PRACTITIONER

## 2023-07-31 RX ORDER — ROPINIROLE 0.25 MG/1
TABLET, FILM COATED ORAL
Qty: 60 TABLET | Refills: 2 | Status: SHIPPED | OUTPATIENT
Start: 2023-07-31

## 2023-07-31 RX ORDER — MELOXICAM 15 MG/1
15 TABLET ORAL DAILY
Qty: 90 TABLET | Refills: 1 | Status: SHIPPED | OUTPATIENT
Start: 2023-07-31

## 2023-07-31 ASSESSMENT — ENCOUNTER SYMPTOMS
EYES NEGATIVE: 1
RESPIRATORY NEGATIVE: 1
GASTROINTESTINAL NEGATIVE: 1
ALLERGIC/IMMUNOLOGIC NEGATIVE: 1

## 2023-07-31 NOTE — PROGRESS NOTES
Chief Complaint   Patient presents with    Hyperlipidemia    Chronic Pain    Hypertension    Other     RLS       Have you seen any other physician or provider since your last visit yes - Dr Noe Chaudhari    Have you had any other diagnostic tests since your last visit? yes - labs last visit    Have you changed or stopped any medications since your last visit? Yes,stress test ,EKG,Echo    I have recommended that this patient have a sigmoidoscopy but he due to refusal reason: not comfortable with test   I have discussed the risks and benefits of this examination with him. The patient verbalizes understanding. Provider will be informed of refusal.       SUBJECTIVE:    Patient Valentin Lloyd is a 59 y.o. male. Chief Complaint   Patient presents with    Hyperlipidemia    Chronic Pain    Hypertension    Other     RLS     HPI:  Patient has a history of RLS. He is currently taking Requip. He has not been taking Miripex. Patient has had hypertension for few years. He has been compliant with taking medications, without side effects from it. He has been following a low-sodium, is active and never exercises. Weight is fluctuating a bit, compared to last visit. His blood pressure is stable 134/74 at this time. He did go see the cardiologist.  He had a stress test that he passed. He is suppose to have a follow up. He says when he breaths cold air it makes him worse. He quit smoking about 30 years ago. 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 He was hooking up a vanity and used . That night he couldn't breath. Patient has had hyperlipidemia for few years. He has been compliant with low fat diet. He has been compliant with taking the medications, without side effects. His weight is down compared to last visit. He is active, and never exercises.       Patient's medications, allergies, past medical, surgical, social and family histories were reviewed and

## 2023-08-04 ENCOUNTER — TELEPHONE (OUTPATIENT)
Dept: CARDIOLOGY | Facility: CLINIC | Age: 63
End: 2023-08-04
Payer: COMMERCIAL

## 2023-08-14 LAB — NONINV COLON CA DNA+OCC BLD SCRN STL QL: NEGATIVE

## 2023-08-15 ENCOUNTER — TRANSCRIBE ORDERS (OUTPATIENT)
Dept: ADMINISTRATIVE | Facility: HOSPITAL | Age: 63
End: 2023-08-15
Payer: COMMERCIAL

## 2023-08-15 DIAGNOSIS — R06.02 SHORTNESS OF BREATH: Primary | ICD-10-CM

## 2023-11-02 DIAGNOSIS — I10 PRIMARY HYPERTENSION: ICD-10-CM

## 2023-11-03 RX ORDER — LOSARTAN POTASSIUM 50 MG/1
50 TABLET ORAL DAILY
Qty: 30 TABLET | Refills: 3 | Status: SHIPPED | OUTPATIENT
Start: 2023-11-03

## 2024-01-08 DIAGNOSIS — E78.00 PURE HYPERCHOLESTEROLEMIA: ICD-10-CM

## 2024-01-08 RX ORDER — PRAVASTATIN SODIUM 20 MG
TABLET ORAL
Qty: 30 TABLET | Refills: 0 | Status: SHIPPED | OUTPATIENT
Start: 2024-01-08

## 2024-02-04 DIAGNOSIS — G25.81 RLS (RESTLESS LEGS SYNDROME): ICD-10-CM

## 2024-02-04 DIAGNOSIS — E78.00 PURE HYPERCHOLESTEROLEMIA: ICD-10-CM

## 2024-02-07 RX ORDER — PRAVASTATIN SODIUM 20 MG
TABLET ORAL
Qty: 30 TABLET | Refills: 0 | Status: SHIPPED | OUTPATIENT
Start: 2024-02-07

## 2024-02-07 RX ORDER — ROPINIROLE 0.25 MG/1
TABLET, FILM COATED ORAL
Qty: 60 TABLET | Refills: 2 | Status: SHIPPED | OUTPATIENT
Start: 2024-02-07

## 2024-02-21 DIAGNOSIS — M19.079 ARTHRITIS OF BIG TOE: ICD-10-CM

## 2024-02-22 RX ORDER — MELOXICAM 15 MG/1
15 TABLET ORAL DAILY
Qty: 90 TABLET | Refills: 1 | OUTPATIENT
Start: 2024-02-22

## 2024-02-29 DIAGNOSIS — M19.079 ARTHRITIS OF BIG TOE: ICD-10-CM

## 2024-02-29 RX ORDER — MELOXICAM 15 MG/1
15 TABLET ORAL DAILY
Qty: 90 TABLET | Refills: 0 | Status: SHIPPED | OUTPATIENT
Start: 2024-02-29

## 2024-03-08 ENCOUNTER — OFFICE VISIT (OUTPATIENT)
Dept: PRIMARY CARE CLINIC | Age: 64
End: 2024-03-08
Payer: COMMERCIAL

## 2024-03-08 VITALS
DIASTOLIC BLOOD PRESSURE: 75 MMHG | SYSTOLIC BLOOD PRESSURE: 137 MMHG | WEIGHT: 236 LBS | OXYGEN SATURATION: 94 % | TEMPERATURE: 97.9 F | HEIGHT: 68 IN | BODY MASS INDEX: 35.77 KG/M2 | HEART RATE: 70 BPM | RESPIRATION RATE: 16 BRPM

## 2024-03-08 DIAGNOSIS — Z12.5 PROSTATE CANCER SCREENING: ICD-10-CM

## 2024-03-08 DIAGNOSIS — E78.00 PURE HYPERCHOLESTEROLEMIA: ICD-10-CM

## 2024-03-08 DIAGNOSIS — G25.81 RLS (RESTLESS LEGS SYNDROME): ICD-10-CM

## 2024-03-08 DIAGNOSIS — I10 PRIMARY HYPERTENSION: ICD-10-CM

## 2024-03-08 DIAGNOSIS — E55.9 VITAMIN D DEFICIENCY: Primary | ICD-10-CM

## 2024-03-08 DIAGNOSIS — M19.079 ARTHRITIS OF BIG TOE: ICD-10-CM

## 2024-03-08 DIAGNOSIS — Z13.29 THYROID DISORDER SCREEN: ICD-10-CM

## 2024-03-08 PROCEDURE — 99214 OFFICE O/P EST MOD 30 MIN: CPT | Performed by: NURSE PRACTITIONER

## 2024-03-08 PROCEDURE — 3078F DIAST BP <80 MM HG: CPT | Performed by: NURSE PRACTITIONER

## 2024-03-08 PROCEDURE — 3075F SYST BP GE 130 - 139MM HG: CPT | Performed by: NURSE PRACTITIONER

## 2024-03-08 RX ORDER — ROPINIROLE 0.25 MG/1
TABLET, FILM COATED ORAL
Qty: 180 TABLET | Refills: 2 | Status: SHIPPED | OUTPATIENT
Start: 2024-03-08

## 2024-03-08 RX ORDER — PRAVASTATIN SODIUM 20 MG
20 TABLET ORAL DAILY
Qty: 90 TABLET | Refills: 0 | Status: SHIPPED | OUTPATIENT
Start: 2024-03-08

## 2024-03-08 RX ORDER — LOSARTAN POTASSIUM 50 MG/1
50 TABLET ORAL DAILY
Qty: 30 TABLET | Refills: 3 | Status: SHIPPED | OUTPATIENT
Start: 2024-03-08

## 2024-03-08 RX ORDER — MELOXICAM 15 MG/1
15 TABLET ORAL DAILY
Qty: 90 TABLET | Refills: 2 | Status: SHIPPED | OUTPATIENT
Start: 2024-03-08

## 2024-03-08 ASSESSMENT — PATIENT HEALTH QUESTIONNAIRE - PHQ9
SUM OF ALL RESPONSES TO PHQ9 QUESTIONS 1 & 2: 0
2. FEELING DOWN, DEPRESSED OR HOPELESS: 0
SUM OF ALL RESPONSES TO PHQ QUESTIONS 1-9: 0
1. LITTLE INTEREST OR PLEASURE IN DOING THINGS: 0
SUM OF ALL RESPONSES TO PHQ QUESTIONS 1-9: 0

## 2024-03-08 ASSESSMENT — ENCOUNTER SYMPTOMS
EYES NEGATIVE: 1
GASTROINTESTINAL NEGATIVE: 1
RESPIRATORY NEGATIVE: 1
ALLERGIC/IMMUNOLOGIC NEGATIVE: 1

## 2024-03-08 NOTE — PROGRESS NOTES
Chief Complaint   Patient presents with    Hypertension    Hyperlipidemia    Arthritis    RLS       Have you seen any other physician or provider since your last visit no    Have you had any other diagnostic tests since your last visit? no    Have you changed or stopped any medications since your last visit? no        SUBJECTIVE:    Patient ID:Timur Montenegro is a 63 y.o. male.    Chief Complaint   Patient presents with    Hypertension    Hyperlipidemia    Arthritis    RLS     HPI:  Patient has had hypertension for few years. He has been compliant with taking medications, without side effects from it. He has been following a low-sodium, is active and rarely exercises.  Weight is fluctuating a bit, compared to last visit. His blood pressure is stable 137/75 at this time. He is not taking Bystolic.     Patient has had hyperlipidemia for few years. He has been compliant with low fat diet. He has been compliant with taking the medications, without side effects. His weight is up compared to last visit. He is active, and rarely exercises.      Patient's medications, allergies, past medical, surgical, social and family histories were reviewed and updated as appropriate.          Review of Systems   Constitutional: Negative.    HENT: Negative.     Eyes: Negative.    Respiratory: Negative.     Cardiovascular: Negative.    Gastrointestinal: Negative.    Endocrine: Negative.    Genitourinary: Negative.    Musculoskeletal: Negative.    Skin: Negative.    Allergic/Immunologic: Negative.    Neurological: Negative.    Hematological: Negative.    Psychiatric/Behavioral: Negative.         OBJECTIVE:  /75 (Site: Right Upper Arm, Position: Sitting, Cuff Size: Large Adult)   Pulse 70   Temp 97.9 °F (36.6 °C) (Temporal)   Resp 16   Ht 1.727 m (5' 7.99\")   Wt 107 kg (236 lb)   SpO2 94% Comment: room air  BMI 35.89 kg/m²    Physical Exam  Vitals and nursing note reviewed.   Constitutional:       Appearance: He is

## 2024-04-30 ENCOUNTER — OFFICE VISIT (OUTPATIENT)
Dept: PRIMARY CARE CLINIC | Age: 64
End: 2024-04-30
Payer: COMMERCIAL

## 2024-04-30 ENCOUNTER — TRANSCRIBE ORDERS (OUTPATIENT)
Dept: LAB | Facility: HOSPITAL | Age: 64
End: 2024-04-30
Payer: COMMERCIAL

## 2024-04-30 ENCOUNTER — LAB (OUTPATIENT)
Dept: LAB | Facility: HOSPITAL | Age: 64
End: 2024-04-30
Payer: COMMERCIAL

## 2024-04-30 VITALS
OXYGEN SATURATION: 93 % | HEIGHT: 68 IN | HEART RATE: 77 BPM | SYSTOLIC BLOOD PRESSURE: 116 MMHG | TEMPERATURE: 97.8 F | BODY MASS INDEX: 35.43 KG/M2 | RESPIRATION RATE: 16 BRPM | DIASTOLIC BLOOD PRESSURE: 68 MMHG | WEIGHT: 233.8 LBS

## 2024-04-30 DIAGNOSIS — M70.51 BURSITIS OF OTHER BURSA OF RIGHT KNEE: ICD-10-CM

## 2024-04-30 DIAGNOSIS — M70.51 BURSITIS OF OTHER BURSA OF RIGHT KNEE: Primary | ICD-10-CM

## 2024-04-30 LAB
ALBUMIN SERPL-MCNC: 3.9 G/DL
ALBUMIN SERPL-MCNC: 3.9 G/DL (ref 3.5–5.2)
ALBUMIN/GLOB SERPL: 1.1 G/DL
ALP BLD-CCNC: 101 U/L
ALP SERPL-CCNC: 101 U/L (ref 39–117)
ALT SERPL W P-5'-P-CCNC: 23 U/L (ref 1–41)
ALT SERPL-CCNC: 23 U/L
ANION GAP SERPL CALCULATED.3IONS-SCNC: 8.8 MMOL/L
ANION GAP SERPL CALCULATED.3IONS-SCNC: 8.8 MMOL/L (ref 5–15)
AST SERPL-CCNC: 23 U/L
AST SERPL-CCNC: 23 U/L (ref 1–40)
BASOPHILS # BLD AUTO: 0.05 10*3/MM3 (ref 0–0.2)
BASOPHILS ABSOLUTE: ABNORMAL
BASOPHILS NFR BLD AUTO: 0.6 % (ref 0–1.5)
BASOPHILS RELATIVE PERCENT: ABNORMAL
BILIRUB SERPL-MCNC: 0.5 MG/DL (ref 0.1–1.4)
BILIRUB SERPL-MCNC: 0.5 MG/DL (ref 0–1.2)
BUN BLDV-MCNC: 21 MG/DL
BUN SERPL-MCNC: 21 MG/DL (ref 8–23)
BUN/CREAT SERPL: 21.4 (ref 7–25)
C-REACTIVE PROTEIN: 4.82
CALCIUM SERPL-MCNC: 9 MG/DL
CALCIUM SPEC-SCNC: 9 MG/DL (ref 8.6–10.5)
CHLORIDE BLD-SCNC: 105 MMOL/L
CHLORIDE SERPL-SCNC: 105 MMOL/L (ref 98–107)
CO2 SERPL-SCNC: 22.2 MMOL/L (ref 22–29)
CO2: 22.2 MMOL/L
CREAT SERPL-MCNC: 0.98 MG/DL
CREAT SERPL-MCNC: 0.98 MG/DL (ref 0.76–1.27)
CRP SERPL-MCNC: 4.82 MG/DL (ref 0–0.5)
DEPRECATED RDW RBC AUTO: 42.7 FL (ref 37–54)
EGFR: 86.6
EGFRCR SERPLBLD CKD-EPI 2021: 86.6 ML/MIN/1.73
EOSINOPHIL # BLD AUTO: 0.51 10*3/MM3 (ref 0–0.4)
EOSINOPHIL NFR BLD AUTO: 5.7 % (ref 0.3–6.2)
EOSINOPHILS ABSOLUTE: ABNORMAL
EOSINOPHILS RELATIVE PERCENT: ABNORMAL
ERYTHROCYTE [DISTWIDTH] IN BLOOD BY AUTOMATED COUNT: 12.9 % (ref 12.3–15.4)
GLOBULIN UR ELPH-MCNC: 3.4 GM/DL
GLUCOSE BLD-MCNC: 133 MG/DL
GLUCOSE SERPL-MCNC: 133 MG/DL (ref 65–99)
HCT VFR BLD AUTO: 40.7 % (ref 37.5–51)
HCT VFR BLD CALC: ABNORMAL %
HEMOGLOBIN: ABNORMAL
HGB BLD-MCNC: 13.5 G/DL (ref 13–17.7)
IMM GRANULOCYTES # BLD AUTO: 0.02 10*3/MM3 (ref 0–0.05)
IMM GRANULOCYTES NFR BLD AUTO: 0.2 % (ref 0–0.5)
LYMPHOCYTES # BLD AUTO: 2.21 10*3/MM3 (ref 0.7–3.1)
LYMPHOCYTES ABSOLUTE: ABNORMAL
LYMPHOCYTES NFR BLD AUTO: 24.9 % (ref 19.6–45.3)
LYMPHOCYTES RELATIVE PERCENT: ABNORMAL
MCH RBC QN AUTO: 29.9 PG (ref 26.6–33)
MCH RBC QN AUTO: ABNORMAL PG
MCHC RBC AUTO-ENTMCNC: 33.2 G/DL (ref 31.5–35.7)
MCHC RBC AUTO-ENTMCNC: ABNORMAL G/DL
MCV RBC AUTO: 90 FL (ref 79–97)
MCV RBC AUTO: ABNORMAL FL
MONOCYTES # BLD AUTO: 0.64 10*3/MM3 (ref 0.1–0.9)
MONOCYTES ABSOLUTE: ABNORMAL
MONOCYTES NFR BLD AUTO: 7.2 % (ref 5–12)
MONOCYTES RELATIVE PERCENT: ABNORMAL
NEUTROPHILS ABSOLUTE: ABNORMAL
NEUTROPHILS NFR BLD AUTO: 5.44 10*3/MM3 (ref 1.7–7)
NEUTROPHILS NFR BLD AUTO: 61.4 % (ref 42.7–76)
NEUTROPHILS RELATIVE PERCENT: ABNORMAL
NRBC BLD AUTO-RTO: 0 /100 WBC (ref 0–0.2)
PDW BLD-RTO: ABNORMAL %
PLATELET # BLD AUTO: 252 10*3/MM3 (ref 140–450)
PLATELET # BLD: ABNORMAL 10*3/UL
PMV BLD AUTO: 11.2 FL (ref 6–12)
PMV BLD AUTO: ABNORMAL FL
POTASSIUM SERPL-SCNC: 4.4 MMOL/L
POTASSIUM SERPL-SCNC: 4.4 MMOL/L (ref 3.5–5.2)
PROT SERPL-MCNC: 7.3 G/DL (ref 6–8.5)
RBC # BLD AUTO: 4.52 10*6/MM3 (ref 4.14–5.8)
RBC # BLD: ABNORMAL 10*6/UL
SEDIMENTATION RATE, ERYTHROCYTE: 20
SODIUM BLD-SCNC: 136 MMOL/L
SODIUM SERPL-SCNC: 136 MMOL/L (ref 136–145)
TOTAL PROTEIN: 7.3
WBC # BLD: ABNORMAL 10*3/UL
WBC NRBC COR # BLD AUTO: 8.87 10*3/MM3 (ref 3.4–10.8)

## 2024-04-30 PROCEDURE — 80053 COMPREHEN METABOLIC PANEL: CPT

## 2024-04-30 PROCEDURE — 36415 COLL VENOUS BLD VENIPUNCTURE: CPT

## 2024-04-30 PROCEDURE — 85025 COMPLETE CBC W/AUTO DIFF WBC: CPT

## 2024-04-30 PROCEDURE — 99213 OFFICE O/P EST LOW 20 MIN: CPT | Performed by: NURSE PRACTITIONER

## 2024-04-30 PROCEDURE — 85652 RBC SED RATE AUTOMATED: CPT

## 2024-04-30 PROCEDURE — 86140 C-REACTIVE PROTEIN: CPT

## 2024-04-30 RX ORDER — CLINDAMYCIN HYDROCHLORIDE 300 MG/1
300 CAPSULE ORAL 3 TIMES DAILY
Qty: 21 CAPSULE | Refills: 0 | Status: SHIPPED | OUTPATIENT
Start: 2024-04-30 | End: 2024-05-07

## 2024-04-30 RX ORDER — IBUPROFEN 600 MG/1
600 TABLET ORAL 3 TIMES DAILY PRN
Qty: 90 TABLET | Refills: 0 | Status: SHIPPED | OUTPATIENT
Start: 2024-04-30

## 2024-04-30 ASSESSMENT — ENCOUNTER SYMPTOMS
ALLERGIC/IMMUNOLOGIC NEGATIVE: 1
GASTROINTESTINAL NEGATIVE: 1
RESPIRATORY NEGATIVE: 1
EYES NEGATIVE: 1

## 2024-04-30 NOTE — PROGRESS NOTES
Chief Complaint   Patient presents with   • Leg Swelling   • Knee Pain     Right         Have you seen any other physician or provider since your last visit no    Have you had any other diagnostic tests since your last visit? no    Have you changed or stopped any medications since your last visit? no        SUBJECTIVE:    Patient ID:Timur Montenegro is a 64 y.o. male.    Chief Complaint   Patient presents with   • Leg Swelling   • Knee Pain     Right       HPI:  Patient has been complaining of right knee pain for the past 4 days. Onset was sudden, related to work. Mechanism of injury: laying cece down on his knees . Inciting event: injured while laying ecce . Current symptoms include: swelling. Pain is Mild - Moderate. Patient has had no similar problems. Pain worse with touch. Better with nothing.  Sx are getting no better. Treatment to date: OTC analgesics which are not very effective.  No fever but reports chills.    Patient's medications, allergies, past medical, surgical, social and family histories were reviewed and updated as appropriate.          Review of Systems   Constitutional: Negative.    HENT: Negative.     Eyes: Negative.    Respiratory: Negative.     Cardiovascular:  Positive for leg swelling.   Gastrointestinal: Negative.    Endocrine: Negative.    Genitourinary: Negative.    Musculoskeletal: Negative.    Skin: Negative.    Allergic/Immunologic: Negative.    Neurological: Negative.    Hematological: Negative.    Psychiatric/Behavioral: Negative.         OBJECTIVE:  /68 (Site: Left Upper Arm, Position: Sitting, Cuff Size: Medium Adult)   Pulse 77   Temp 97.8 °F (36.6 °C) (Temporal)   Resp 16   Ht 1.727 m (5' 7.99\")   Wt 106.1 kg (233 lb 12.8 oz)   SpO2 93% Comment: room air  BMI 35.56 kg/m²    Physical Exam  Vitals and nursing note reviewed.   Constitutional:       Appearance: He is well-developed.   HENT:      Head: Normocephalic and atraumatic.   Eyes:

## 2024-04-30 NOTE — PATIENT INSTRUCTIONS
The [employer] ID. This will be your [employer] login ID and cannot be changed, so think of one that is secure and easy to remember.  Create a [employer] password. You can change your password at any time.  Enter your Password Reset Question and Answer. This can be used at a later time if you forget your password.   Enter your e-mail address. You will receive e-mail notification when new information is available in [employer].  Click Sign Up. You can now view your medical record.     Additional Information  If you have questions, please contact your physician practice where you receive care. Remember, [employer] is NOT to be used for urgent needs. For medical emergencies, dial 911.

## 2024-05-01 LAB — ERYTHROCYTE [SEDIMENTATION RATE] IN BLOOD: 20 MM/HR (ref 0–20)

## 2024-05-03 ENCOUNTER — OFFICE VISIT (OUTPATIENT)
Dept: PRIMARY CARE CLINIC | Age: 64
End: 2024-05-03
Payer: COMMERCIAL

## 2024-05-03 VITALS
SYSTOLIC BLOOD PRESSURE: 131 MMHG | DIASTOLIC BLOOD PRESSURE: 89 MMHG | WEIGHT: 234.6 LBS | OXYGEN SATURATION: 94 % | BODY MASS INDEX: 35.68 KG/M2 | HEART RATE: 75 BPM

## 2024-05-03 DIAGNOSIS — M70.51 BURSITIS OF OTHER BURSA OF RIGHT KNEE: Primary | ICD-10-CM

## 2024-05-03 DIAGNOSIS — Z13.220 SCREENING FOR HYPERLIPIDEMIA: ICD-10-CM

## 2024-05-03 PROCEDURE — 99213 OFFICE O/P EST LOW 20 MIN: CPT | Performed by: NURSE PRACTITIONER

## 2024-05-03 RX ORDER — CLINDAMYCIN HYDROCHLORIDE 300 MG/1
300 CAPSULE ORAL 3 TIMES DAILY
Qty: 12 CAPSULE | Refills: 0 | Status: SHIPPED | OUTPATIENT
Start: 2024-05-03 | End: 2024-05-07

## 2024-05-03 NOTE — PROGRESS NOTES
Chief Complaint   Patient presents with    Follow-up    Leg Swelling    Knee Pain     Right Knee       Have you seen any other physician or provider since your last visit no    Have you had any other diagnostic tests since your last visit? yes - labs    Have you changed or stopped any medications since your last visit? no      SUBJECTIVE:    Patient ID: Timur Montenegro is a 64 y.o.male.    Chief Complaint   Patient presents with    Follow-up    Leg Swelling    Knee Pain     Right Knee         HPI:  Patient is here today to follow-up on right knee pain and swelling. He was started on clindamycin 4 days ago to treat Bursitis. Patient states swelling has improved. Describes pain as Mild. Denies fever or chills.    Patient's medications, allergies, past medical, surgical, social and family histories were reviewed and updated as appropriate in electronic medical record.        Outpatient Medications Marked as Taking for the 5/3/24 encounter (Office Visit) with Melanie Camarillo APRN   Medication Sig Dispense Refill    [] clindamycin (CLEOCIN) 300 MG capsule Take 1 capsule by mouth 3 times daily for 4 days 12 capsule 0    [] clindamycin (CLEOCIN) 300 MG capsule Take 1 capsule by mouth 3 times daily for 7 days 21 capsule 0    ibuprofen (ADVIL;MOTRIN) 600 MG tablet Take 1 tablet by mouth 3 times daily as needed for Pain 90 tablet 0    rOPINIRole (REQUIP) 0.25 MG tablet Take 1 tablet by mouth 1 hour before bedtime. May increase to 2 tablets Every Night as needed 180 tablet 2    pravastatin (PRAVACHOL) 20 MG tablet Take 1 tablet by mouth daily 90 tablet 0    meloxicam (MOBIC) 15 MG tablet Take 1 tablet by mouth daily 90 tablet 2    losartan (COZAAR) 50 MG tablet Take 1 tablet by mouth daily 30 tablet 3        Review of Systems   Constitutional:  Negative for chills, fatigue and fever.   HENT:  Negative for congestion, ear pain and sinus pressure.    Eyes:  Negative for discharge and visual disturbance.

## 2024-05-12 DIAGNOSIS — E78.00 PURE HYPERCHOLESTEROLEMIA: ICD-10-CM

## 2024-05-14 RX ORDER — PRAVASTATIN SODIUM 20 MG
20 TABLET ORAL DAILY
Qty: 90 TABLET | Refills: 0 | Status: SHIPPED | OUTPATIENT
Start: 2024-05-14

## 2024-05-28 DIAGNOSIS — M70.51 BURSITIS OF OTHER BURSA OF RIGHT KNEE: ICD-10-CM

## 2024-07-05 ENCOUNTER — TRANSCRIBE ORDERS (OUTPATIENT)
Dept: LAB | Facility: HOSPITAL | Age: 64
End: 2024-07-05
Payer: COMMERCIAL

## 2024-07-05 ENCOUNTER — LAB (OUTPATIENT)
Dept: LAB | Facility: HOSPITAL | Age: 64
End: 2024-07-05
Payer: COMMERCIAL

## 2024-07-05 DIAGNOSIS — E78.00 PURE HYPERCHOLESTEROLEMIA: ICD-10-CM

## 2024-07-05 DIAGNOSIS — Z13.29 SCREENING FOR THYROID DISORDER: ICD-10-CM

## 2024-07-05 DIAGNOSIS — I10 ESSENTIAL (PRIMARY) HYPERTENSION: ICD-10-CM

## 2024-07-05 DIAGNOSIS — Z12.5 SPECIAL SCREENING FOR MALIGNANT NEOPLASM OF PROSTATE: ICD-10-CM

## 2024-07-05 DIAGNOSIS — E55.9 VITAMIN D DEFICIENCY: ICD-10-CM

## 2024-07-05 DIAGNOSIS — E55.9 VITAMIN D DEFICIENCY: Primary | ICD-10-CM

## 2024-07-05 LAB
25(OH)D3 SERPL-MCNC: 21.1 NG/ML (ref 30–100)
ALBUMIN SERPL-MCNC: 4 G/DL (ref 3.5–5.2)
ALBUMIN/GLOB SERPL: 1.5 G/DL
ALP SERPL-CCNC: 97 U/L (ref 39–117)
ALT SERPL W P-5'-P-CCNC: 26 U/L (ref 1–41)
ANION GAP SERPL CALCULATED.3IONS-SCNC: 9 MMOL/L (ref 5–15)
AST SERPL-CCNC: 20 U/L (ref 1–40)
BASOPHILS # BLD AUTO: 0.05 10*3/MM3 (ref 0–0.2)
BASOPHILS NFR BLD AUTO: 0.8 % (ref 0–1.5)
BILIRUB SERPL-MCNC: 0.3 MG/DL (ref 0–1.2)
BUN SERPL-MCNC: 26 MG/DL (ref 8–23)
BUN/CREAT SERPL: 26.3 (ref 7–25)
CALCIUM SPEC-SCNC: 8.9 MG/DL (ref 8.6–10.5)
CHLORIDE SERPL-SCNC: 109 MMOL/L (ref 98–107)
CHOLEST SERPL-MCNC: 149 MG/DL (ref 0–200)
CO2 SERPL-SCNC: 22 MMOL/L (ref 22–29)
CREAT SERPL-MCNC: 0.99 MG/DL (ref 0.76–1.27)
DEPRECATED RDW RBC AUTO: 43.5 FL (ref 37–54)
EGFRCR SERPLBLD CKD-EPI 2021: 85.6 ML/MIN/1.73
EOSINOPHIL # BLD AUTO: 0.39 10*3/MM3 (ref 0–0.4)
EOSINOPHIL NFR BLD AUTO: 6.4 % (ref 0.3–6.2)
ERYTHROCYTE [DISTWIDTH] IN BLOOD BY AUTOMATED COUNT: 13.3 % (ref 12.3–15.4)
FOLATE SERPL-MCNC: 8.92 NG/ML (ref 4.78–24.2)
GLOBULIN UR ELPH-MCNC: 2.7 GM/DL
GLUCOSE SERPL-MCNC: 112 MG/DL (ref 65–99)
HCT VFR BLD AUTO: 42.6 % (ref 37.5–51)
HDLC SERPL-MCNC: 29 MG/DL (ref 40–60)
HGB BLD-MCNC: 14 G/DL (ref 13–17.7)
IMM GRANULOCYTES # BLD AUTO: 0.02 10*3/MM3 (ref 0–0.05)
IMM GRANULOCYTES NFR BLD AUTO: 0.3 % (ref 0–0.5)
LDLC SERPL CALC-MCNC: 103 MG/DL (ref 0–100)
LDLC/HDLC SERPL: 3.52 {RATIO}
LYMPHOCYTES # BLD AUTO: 2.44 10*3/MM3 (ref 0.7–3.1)
LYMPHOCYTES NFR BLD AUTO: 40.3 % (ref 19.6–45.3)
MCH RBC QN AUTO: 29.3 PG (ref 26.6–33)
MCHC RBC AUTO-ENTMCNC: 32.9 G/DL (ref 31.5–35.7)
MCV RBC AUTO: 89.1 FL (ref 79–97)
MONOCYTES # BLD AUTO: 0.62 10*3/MM3 (ref 0.1–0.9)
MONOCYTES NFR BLD AUTO: 10.2 % (ref 5–12)
NEUTROPHILS NFR BLD AUTO: 2.54 10*3/MM3 (ref 1.7–7)
NEUTROPHILS NFR BLD AUTO: 42 % (ref 42.7–76)
NRBC BLD AUTO-RTO: 0 /100 WBC (ref 0–0.2)
PLATELET # BLD AUTO: 228 10*3/MM3 (ref 140–450)
PMV BLD AUTO: 11 FL (ref 6–12)
POTASSIUM SERPL-SCNC: 4.3 MMOL/L (ref 3.5–5.2)
PROT SERPL-MCNC: 6.7 G/DL (ref 6–8.5)
PSA SERPL-MCNC: 0.9 NG/ML (ref 0–4)
RBC # BLD AUTO: 4.78 10*6/MM3 (ref 4.14–5.8)
SODIUM SERPL-SCNC: 140 MMOL/L (ref 136–145)
TRIGL SERPL-MCNC: 89 MG/DL (ref 0–150)
TSH SERPL DL<=0.05 MIU/L-ACNC: 3.78 UIU/ML (ref 0.27–4.2)
VIT B12 BLD-MCNC: 361 PG/ML (ref 211–946)
VLDLC SERPL-MCNC: 17 MG/DL (ref 5–40)
WBC NRBC COR # BLD AUTO: 6.06 10*3/MM3 (ref 3.4–10.8)

## 2024-07-05 PROCEDURE — 80061 LIPID PANEL: CPT

## 2024-07-05 PROCEDURE — 82607 VITAMIN B-12: CPT

## 2024-07-05 PROCEDURE — 36415 COLL VENOUS BLD VENIPUNCTURE: CPT

## 2024-07-05 PROCEDURE — G0103 PSA SCREENING: HCPCS

## 2024-07-05 PROCEDURE — 82746 ASSAY OF FOLIC ACID SERUM: CPT

## 2024-07-05 PROCEDURE — 80050 GENERAL HEALTH PANEL: CPT

## 2024-07-05 PROCEDURE — 82306 VITAMIN D 25 HYDROXY: CPT

## 2024-07-09 ENCOUNTER — OFFICE VISIT (OUTPATIENT)
Dept: PRIMARY CARE CLINIC | Age: 64
End: 2024-07-09
Payer: COMMERCIAL

## 2024-07-09 VITALS
HEART RATE: 59 BPM | TEMPERATURE: 97.8 F | OXYGEN SATURATION: 96 % | BODY MASS INDEX: 36.62 KG/M2 | SYSTOLIC BLOOD PRESSURE: 122 MMHG | WEIGHT: 241.6 LBS | RESPIRATION RATE: 16 BRPM | HEIGHT: 68 IN | DIASTOLIC BLOOD PRESSURE: 67 MMHG

## 2024-07-09 DIAGNOSIS — M17.11 PRIMARY OSTEOARTHRITIS OF RIGHT KNEE: ICD-10-CM

## 2024-07-09 DIAGNOSIS — E55.9 VITAMIN D DEFICIENCY: ICD-10-CM

## 2024-07-09 DIAGNOSIS — N52.2 DRUG-INDUCED ERECTILE DYSFUNCTION: ICD-10-CM

## 2024-07-09 DIAGNOSIS — I10 PRIMARY HYPERTENSION: Primary | ICD-10-CM

## 2024-07-09 DIAGNOSIS — M72.2 PLANTAR FASCIITIS OF LEFT FOOT: ICD-10-CM

## 2024-07-09 PROCEDURE — 99214 OFFICE O/P EST MOD 30 MIN: CPT | Performed by: NURSE PRACTITIONER

## 2024-07-09 PROCEDURE — 3078F DIAST BP <80 MM HG: CPT | Performed by: NURSE PRACTITIONER

## 2024-07-09 PROCEDURE — 3074F SYST BP LT 130 MM HG: CPT | Performed by: NURSE PRACTITIONER

## 2024-07-09 RX ORDER — METHYLPREDNISOLONE 4 MG/1
TABLET ORAL
Qty: 21 TABLET | Refills: 0 | Status: SHIPPED | OUTPATIENT
Start: 2024-07-09 | End: 2024-07-15

## 2024-07-09 RX ORDER — ERGOCALCIFEROL 1.25 MG/1
50000 CAPSULE ORAL WEEKLY
Qty: 12 CAPSULE | Refills: 2 | Status: SHIPPED | OUTPATIENT
Start: 2024-07-09

## 2024-07-09 SDOH — ECONOMIC STABILITY: FOOD INSECURITY: WITHIN THE PAST 12 MONTHS, THE FOOD YOU BOUGHT JUST DIDN'T LAST AND YOU DIDN'T HAVE MONEY TO GET MORE.: NEVER TRUE

## 2024-07-09 SDOH — ECONOMIC STABILITY: FOOD INSECURITY: WITHIN THE PAST 12 MONTHS, YOU WORRIED THAT YOUR FOOD WOULD RUN OUT BEFORE YOU GOT MONEY TO BUY MORE.: NEVER TRUE

## 2024-07-09 SDOH — ECONOMIC STABILITY: INCOME INSECURITY: HOW HARD IS IT FOR YOU TO PAY FOR THE VERY BASICS LIKE FOOD, HOUSING, MEDICAL CARE, AND HEATING?: NOT HARD AT ALL

## 2024-07-09 ASSESSMENT — ENCOUNTER SYMPTOMS
EYES NEGATIVE: 1
ALLERGIC/IMMUNOLOGIC NEGATIVE: 1
RESPIRATORY NEGATIVE: 1
GASTROINTESTINAL NEGATIVE: 1

## 2024-07-09 NOTE — PROGRESS NOTES
Chief Complaint   Patient presents with    Hyperlipidemia    Arthritis    Hypertension       Have you seen any other physician or provider since your last visit no    Have you had any other diagnostic tests since your last visit? yes - labs    Have you changed or stopped any medications since your last visit? no        SUBJECTIVE:    Patient ID:Timur Montenegro is a 64 y.o. male.    Chief Complaint   Patient presents with    Hyperlipidemia    Arthritis    Hypertension     HPI:  Patient has had hyperlipidemia for few years. He has been compliant with low fat diet. He has been compliant with taking the medications, without side effects. His weight is up compared to last visit. He is active, and never exercises.    Patient has had hypertension for few years. He has been compliant with taking medications, without side effects from it. He has been following a low-sodium, is active and never exercises.  Weight is increasing steadily, compared to last visit. His blood pressure is stable at this time.    He had an episode of near syncope at the store.  He had an episode of light headedness.    Patient has Osteoarthritis. He is taking Mobic with good results. He stopped taking Ibuprofen. He did not feel he needed it and was concerned about effecting his kidneys.     Since starting the blood pressure medications he complains of ED.  He can get an erection but it is not full with poor performance.      Patient's medications, allergies, past medical, surgical, social and family histories were reviewed and updated as appropriate.          Review of Systems   Constitutional: Negative.    HENT: Negative.     Eyes: Negative.    Respiratory: Negative.     Cardiovascular: Negative.    Gastrointestinal: Negative.    Endocrine: Negative.    Genitourinary: Negative.    Musculoskeletal:  Positive for arthralgias.   Skin: Negative.    Allergic/Immunologic: Negative.    Neurological: Negative.    Hematological: Negative.

## 2024-07-11 DIAGNOSIS — I10 PRIMARY HYPERTENSION: ICD-10-CM

## 2024-07-11 RX ORDER — LOSARTAN POTASSIUM 50 MG/1
50 TABLET ORAL DAILY
Qty: 30 TABLET | Refills: 3 | Status: SHIPPED | OUTPATIENT
Start: 2024-07-11

## 2024-09-09 DIAGNOSIS — E78.00 PURE HYPERCHOLESTEROLEMIA: ICD-10-CM

## 2024-09-09 RX ORDER — PRAVASTATIN SODIUM 20 MG
20 TABLET ORAL DAILY
Qty: 90 TABLET | Refills: 1 | Status: SHIPPED | OUTPATIENT
Start: 2024-09-09

## 2024-09-27 ENCOUNTER — OFFICE VISIT (OUTPATIENT)
Dept: PRIMARY CARE CLINIC | Age: 64
End: 2024-09-27
Payer: COMMERCIAL

## 2024-09-27 VITALS
DIASTOLIC BLOOD PRESSURE: 80 MMHG | WEIGHT: 241 LBS | HEART RATE: 73 BPM | SYSTOLIC BLOOD PRESSURE: 139 MMHG | BODY MASS INDEX: 36.65 KG/M2 | TEMPERATURE: 97.9 F | OXYGEN SATURATION: 96 %

## 2024-09-27 DIAGNOSIS — R09.81 NASAL CONGESTION: ICD-10-CM

## 2024-09-27 DIAGNOSIS — J02.9 ACUTE PHARYNGITIS, UNSPECIFIED ETIOLOGY: Primary | ICD-10-CM

## 2024-09-27 DIAGNOSIS — J02.9 SORE THROAT: ICD-10-CM

## 2024-09-27 DIAGNOSIS — J02.9 ACUTE PHARYNGITIS, UNSPECIFIED ETIOLOGY: ICD-10-CM

## 2024-09-27 LAB
INFLUENZA A ANTIBODY: NORMAL
INFLUENZA B ANTIBODY: NORMAL
Lab: ABNORMAL
QC PASS/FAIL: ABNORMAL
S PYO AG THROAT QL: NORMAL
SARS-COV-2 RDRP RESP QL NAA+PROBE: NEGATIVE

## 2024-09-27 PROCEDURE — 87635 SARS-COV-2 COVID-19 AMP PRB: CPT | Performed by: INTERNAL MEDICINE

## 2024-09-27 PROCEDURE — 87804 INFLUENZA ASSAY W/OPTIC: CPT | Performed by: INTERNAL MEDICINE

## 2024-09-27 PROCEDURE — 87880 STREP A ASSAY W/OPTIC: CPT | Performed by: INTERNAL MEDICINE

## 2024-09-27 RX ORDER — GUAIFENESIN 600 MG/1
600 TABLET, EXTENDED RELEASE ORAL 2 TIMES DAILY
Qty: 30 TABLET | Refills: 0 | Status: SHIPPED | OUTPATIENT
Start: 2024-09-27 | End: 2024-09-27 | Stop reason: SDUPTHER

## 2024-09-27 RX ORDER — AZITHROMYCIN 250 MG/1
TABLET, FILM COATED ORAL
Qty: 6 TABLET | Refills: 0 | Status: SHIPPED | OUTPATIENT
Start: 2024-09-27 | End: 2024-09-27 | Stop reason: SDUPTHER

## 2024-09-27 RX ORDER — CETIRIZINE HYDROCHLORIDE 10 MG/1
10 TABLET ORAL DAILY
Qty: 30 TABLET | Refills: 0 | Status: SHIPPED | OUTPATIENT
Start: 2024-09-27 | End: 2024-09-27 | Stop reason: SDUPTHER

## 2024-09-27 RX ORDER — GUAIFENESIN 600 MG/1
600 TABLET, EXTENDED RELEASE ORAL 2 TIMES DAILY
Qty: 30 TABLET | Refills: 0 | Status: SHIPPED | OUTPATIENT
Start: 2024-09-27 | End: 2024-10-12

## 2024-09-27 RX ORDER — CETIRIZINE HYDROCHLORIDE 10 MG/1
10 TABLET ORAL DAILY
Qty: 30 TABLET | Refills: 0 | Status: SHIPPED | OUTPATIENT
Start: 2024-09-27

## 2024-09-27 RX ORDER — AZITHROMYCIN 250 MG/1
TABLET, FILM COATED ORAL
Qty: 6 TABLET | Refills: 0 | Status: SHIPPED | OUTPATIENT
Start: 2024-09-27 | End: 2024-10-07

## 2024-09-27 ASSESSMENT — ENCOUNTER SYMPTOMS
SORE THROAT: 1
SHORTNESS OF BREATH: 0
NAUSEA: 0
ALLERGIC/IMMUNOLOGIC NEGATIVE: 1
RHINORRHEA: 1
DIARRHEA: 0
VOMITING: 0
COUGH: 1

## 2024-09-30 DIAGNOSIS — I10 PRIMARY HYPERTENSION: ICD-10-CM

## 2024-09-30 RX ORDER — LOSARTAN POTASSIUM 50 MG/1
50 TABLET ORAL DAILY
Qty: 30 TABLET | Refills: 3 | Status: SHIPPED | OUTPATIENT
Start: 2024-09-30

## 2024-10-22 ENCOUNTER — OFFICE VISIT (OUTPATIENT)
Dept: PRIMARY CARE CLINIC | Age: 64
End: 2024-10-22
Payer: COMMERCIAL

## 2024-10-22 VITALS
BODY MASS INDEX: 36.87 KG/M2 | HEART RATE: 76 BPM | DIASTOLIC BLOOD PRESSURE: 84 MMHG | SYSTOLIC BLOOD PRESSURE: 136 MMHG | WEIGHT: 242.4 LBS | OXYGEN SATURATION: 94 %

## 2024-10-22 DIAGNOSIS — I10 PRIMARY HYPERTENSION: Primary | ICD-10-CM

## 2024-10-22 DIAGNOSIS — E53.9 VITAMIN B DEFICIENCY: ICD-10-CM

## 2024-10-22 DIAGNOSIS — Z13.29 THYROID DISORDER SCREEN: ICD-10-CM

## 2024-10-22 DIAGNOSIS — E55.9 VITAMIN D DEFICIENCY: ICD-10-CM

## 2024-10-22 DIAGNOSIS — M25.50 ARTHRALGIA, UNSPECIFIED JOINT: ICD-10-CM

## 2024-10-22 DIAGNOSIS — E78.00 PURE HYPERCHOLESTEROLEMIA: ICD-10-CM

## 2024-10-22 PROCEDURE — 3079F DIAST BP 80-89 MM HG: CPT | Performed by: NURSE PRACTITIONER

## 2024-10-22 PROCEDURE — 3075F SYST BP GE 130 - 139MM HG: CPT | Performed by: NURSE PRACTITIONER

## 2024-10-22 PROCEDURE — 99214 OFFICE O/P EST MOD 30 MIN: CPT | Performed by: NURSE PRACTITIONER

## 2024-10-22 RX ORDER — TRAMADOL HYDROCHLORIDE 50 MG/1
50 TABLET ORAL EVERY 6 HOURS PRN
Qty: 20 TABLET | Refills: 0 | Status: SHIPPED | OUTPATIENT
Start: 2024-10-22 | End: 2024-10-27

## 2024-10-22 NOTE — PROGRESS NOTES
Chief Complaint   Patient presents with    3 Month Follow-Up    Hypertension    Hyperlipidemia    Arthritis       Have you seen any other physician or provider since your last visit no    Have you had any other diagnostic tests since your last visit? no    Have you changed or stopped any medications since your last visit? no     I have recommended that this patient have a flu shot but he declines at this time. I have discussed the risks and benefits of this examination with him. The patient verbalizes understanding.     SUBJECTIVE:    Patient ID: Timur Montenegro is a 64 y.o.male.    Chief Complaint   Patient presents with    3 Month Follow-Up    Hypertension    Hyperlipidemia    Arthritis         HPI:  Patient is here today for 3 month follow-up. Patient has had hypertension and hyperlipidemia for few years. He has been compliant with taking medications, without side effects from it. He has been following a low-sodium, is active and never exercises.  Weight is stable, compared to last visit. His blood pressure is stable at this time.    Patient's medications, allergies, past medical, surgical, social and family histories were reviewed and updated as appropriate in electronic medical record.        Outpatient Medications Marked as Taking for the 10/22/24 encounter (Office Visit) with Melanie Camarillo APRN   Medication Sig Dispense Refill    [] traMADol (ULTRAM) 50 MG tablet Take 1 tablet by mouth every 6 hours as needed for Pain for up to 5 days. Intended supply: 5 days. Take lowest dose possible to manage pain Max Daily Amount: 200 mg 20 tablet 0    losartan (COZAAR) 50 MG tablet Take 1 tablet by mouth daily 30 tablet 3    pravastatin (PRAVACHOL) 20 MG tablet Take 1 tablet by mouth Daily. 90 tablet 1    vitamin D (ERGOCALCIFEROL) 1.25 MG (83333 UT) CAPS capsule Take 1 capsule by mouth once a week 12 capsule 2    rOPINIRole (REQUIP) 0.25 MG tablet Take 1 tablet by mouth 1 hour before bedtime. May increase

## 2024-12-04 DIAGNOSIS — I10 PRIMARY HYPERTENSION: ICD-10-CM

## 2024-12-05 RX ORDER — LOSARTAN POTASSIUM 50 MG/1
50 TABLET ORAL DAILY
Qty: 30 TABLET | Refills: 3 | Status: SHIPPED | OUTPATIENT
Start: 2024-12-05

## 2025-01-15 ENCOUNTER — OFFICE VISIT (OUTPATIENT)
Age: 65
End: 2025-01-15
Payer: COMMERCIAL

## 2025-01-15 VITALS
BODY MASS INDEX: 37.38 KG/M2 | HEIGHT: 68 IN | TEMPERATURE: 97.5 F | OXYGEN SATURATION: 98 % | RESPIRATION RATE: 16 BRPM | WEIGHT: 246.6 LBS | HEART RATE: 60 BPM | DIASTOLIC BLOOD PRESSURE: 79 MMHG | SYSTOLIC BLOOD PRESSURE: 143 MMHG

## 2025-01-15 DIAGNOSIS — M65.332 TRIGGER MIDDLE FINGER OF LEFT HAND: Primary | ICD-10-CM

## 2025-01-15 PROCEDURE — 99213 OFFICE O/P EST LOW 20 MIN: CPT | Performed by: NURSE PRACTITIONER

## 2025-01-15 ASSESSMENT — ENCOUNTER SYMPTOMS
ALLERGIC/IMMUNOLOGIC NEGATIVE: 1
RESPIRATORY NEGATIVE: 1
EYES NEGATIVE: 1

## 2025-01-15 NOTE — PROGRESS NOTES
Chief Complaint   Patient presents with    Hand Pain     Left hand         Have you seen any other physician or provider since your last visit no    Have you had any other diagnostic tests since your last visit? no    Have you changed or stopped any medications since your last visit? no        SUBJECTIVE:    Patient ID:Timur Montenegro is a 64 y.o. male.    Chief Complaint   Patient presents with    Hand Pain     Left hand       HPI:  Patient is here with concerns about his left hand. He has a problem with a trigger finger for about 1-2 months.   History of Present Illness  The patient presents for evaluation of left middle finger pain.    He reports an inability to extend his left middle finger, which is accompanied by a clicking sound upon eventual extension. The onset of these symptoms was noted after a night of sleeping with his arm elevated on a pillow. He describes the pain as severe, particularly in the morning, but notes that it tends to subside throughout the day. He has no history of any injury to the affected area. He also reports experiencing joint pain in the same finger. He attempted to alleviate the discomfort by keeping the finger flat under his pillow during sleep, which seemed to provide some relief.    MEDICATIONS  Mobic      Patient's medications, allergies, past medical, surgical, social and family histories were reviewed and updated as appropriate.          Review of Systems   Constitutional: Negative.    HENT: Negative.     Eyes: Negative.    Respiratory: Negative.     Cardiovascular: Negative.    Endocrine: Negative.    Genitourinary: Negative.    Musculoskeletal:  Positive for joint swelling.        Trigger finger   Skin: Negative.    Allergic/Immunologic: Negative.    Neurological: Negative.    Hematological: Negative.    Psychiatric/Behavioral: Negative.         OBJECTIVE:  BP (!) 143/79 (Site: Right Upper Arm, Position: Sitting, Cuff Size: Large Adult)   Pulse 60   Temp 97.5 °F (36.4

## 2025-01-22 DIAGNOSIS — E78.00 PURE HYPERCHOLESTEROLEMIA: ICD-10-CM

## 2025-01-22 DIAGNOSIS — M19.079 ARTHRITIS OF BIG TOE: ICD-10-CM

## 2025-01-22 RX ORDER — MELOXICAM 15 MG/1
15 TABLET ORAL DAILY
Qty: 90 TABLET | Refills: 1 | Status: SHIPPED | OUTPATIENT
Start: 2025-01-22

## 2025-01-22 RX ORDER — PRAVASTATIN SODIUM 20 MG
20 TABLET ORAL DAILY
Qty: 90 TABLET | Refills: 1 | Status: SHIPPED | OUTPATIENT
Start: 2025-01-22

## 2025-01-28 DIAGNOSIS — M79.642 LEFT HAND PAIN: Primary | ICD-10-CM

## 2025-01-29 ENCOUNTER — OFFICE VISIT (OUTPATIENT)
Dept: ORTHOPEDIC SURGERY | Facility: CLINIC | Age: 65
End: 2025-01-29
Payer: COMMERCIAL

## 2025-01-29 VITALS
HEIGHT: 72 IN | SYSTOLIC BLOOD PRESSURE: 130 MMHG | BODY MASS INDEX: 33.92 KG/M2 | WEIGHT: 250.4 LBS | DIASTOLIC BLOOD PRESSURE: 80 MMHG

## 2025-01-29 DIAGNOSIS — M79.642 LEFT HAND PAIN: ICD-10-CM

## 2025-01-29 DIAGNOSIS — M65.332 TRIGGER MIDDLE FINGER OF LEFT HAND: Primary | Chronic | ICD-10-CM

## 2025-01-29 RX ORDER — LIDOCAINE HYDROCHLORIDE 20 MG/ML
0.5 INJECTION, SOLUTION INFILTRATION; PERINEURAL
Status: COMPLETED | OUTPATIENT
Start: 2025-01-29 | End: 2025-01-29

## 2025-01-29 RX ORDER — TRIAMCINOLONE ACETONIDE 40 MG/ML
10 INJECTION, SUSPENSION INTRA-ARTICULAR; INTRAMUSCULAR
Status: COMPLETED | OUTPATIENT
Start: 2025-01-29 | End: 2025-01-29

## 2025-01-29 RX ADMIN — LIDOCAINE HYDROCHLORIDE 0.5 ML: 20 INJECTION, SOLUTION INFILTRATION; PERINEURAL at 09:23

## 2025-01-29 RX ADMIN — TRIAMCINOLONE ACETONIDE 10 MG: 40 INJECTION, SUSPENSION INTRA-ARTICULAR; INTRAMUSCULAR at 09:23

## 2025-01-29 NOTE — PROGRESS NOTES
Office Note     Name: Derrick Rhoades    : 1960     MRN: [id number]     Chief Complaint  Pain of the Left Hand (Trigger finger left middle finger x 7-8 months. No known injury. )    Subjective     History of Present Illness:  Derrick Rhoades is a 64 y.o. male presenting today for evaluation of left middle finger locking and pain ongoing for the past 7 to 8 months that has been steadily worsening.  Patient denies any known injury or inciting event.  He notes that his symptoms tend to be worse at nighttime when he wakes up in the morning his finger will occasionally be drawn in a flexed position and very painful to unlock.  His symptoms do improve throughout the day as he works with his hands.  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  He denies previous episodes of triggering.  He denies paresthesias in his hand but does admit to occasional tingling in his shoulder when sleeping.  He denies any previous injury or surgery to the affected area.    Review of Systems     Past Medical History:   Diagnosis Date    Arthritis     Heart murmur     Hyperlipidemia     Hypertension         Past Surgical History:   Procedure Laterality Date    HERNIA REPAIR         Family History   Problem Relation Age of Onset    Heart disease Mother     COPD Father     Stroke Sister     Heart attack Brother        Social History     Socioeconomic History    Marital status:    Tobacco Use    Smoking status: Former     Types: Cigarettes    Smokeless tobacco: Never   Vaping Use    Vaping status: Never Used   Substance and Sexual Activity    Alcohol use: Never    Drug use: Never         Current Outpatient Medications:     ibuprofen (ADVIL,MOTRIN) 600 MG tablet, Take 1 tablet by mouth 3 (Three) Times a Day As Needed For Pain, Disp: 90 tablet, Rfl: 0    losartan (COZAAR) 50 MG tablet, Take 1 tablet by mouth Daily., Disp: 30 tablet, Rfl: 3    meclizine (ANTIVERT) 25 MG tablet, Take 1 tablet by  "mouth 3 (Three) Times a Day As Needed For Dizziness, Disp: 30 tablet, Rfl: 0    meloxicam (MOBIC) 15 MG tablet, Take 1 tablet by mouth Daily., Disp: 90 tablet, Rfl: 1    pravastatin (PRAVACHOL) 20 MG tablet, Take 1 tablet by mouth Daily., Disp: 90 tablet, Rfl: 1    rOPINIRole (REQUIP) 0.25 MG tablet, Take 1 tablet by mouth 1 hour before bedtime. May increase to 2 tablets Every Night as needed, Disp: 60 tablet, Rfl: 2    Allergies   Allergen Reactions    Amoxicillin Shortness Of Breath    Ampicillin Shortness Of Breath     choking    Lisinopril Other (See Comments)     cough           Objective   /80   Ht 182.9 cm (72.01\")   Wt 114 kg (250 lb 6.4 oz)   BMI 33.95 kg/m²    BMI is >= 30 and <35. (Class 1 Obesity). The following options were offered after discussion;: weight loss educational material (shared in after visit summary)       Physical Exam  Left Hand Exam     Comments:  Small tender nodule at the volar base of the left middle finger A1 pulley.  Active triggering noted in office.  No other trigger finger is seen.  He is able to make a composite fist with a normal cascade of motion.  Cap refill brisk to each digit, gross sensation intact to light touch.           Extremity DVT signs are negative by clinical screen.     Independent Review of Radiographic Studies:    3 view plain from the left hand were taken in the office today, for the evaluation of pain and joint condition, no comparison views available.  No acute osseous abnormalities are seen.  Mild degenerative changes are seen at the thumb IP joint with a small dorsal osteophyte.  Otherwise no significant degenerative changes are seen.  There is a metallic object in the dorsal webspace between the third and fourth fingers.  This measures 3 x 4 mm on AP view.    - Injection Tendon or Ligament    Date/Time: 1/29/2025 9:23 AM    Performed by: Elías Hurst PA-C  Authorized by: Elías Hurst PA-C  Consent: Verbal consent obtained. Written consent " "obtained.  Risks and benefits: risks, benefits and alternatives were discussed  Consent given by: patient  Patient understanding: patient states understanding of the procedure being performed  Patient consent: the patient's understanding of the procedure matches consent given  Procedure consent: procedure consent matches procedure scheduled  Relevant documents: relevant documents present and verified  Test results: test results available and properly labeled  Site marked: the operative site was marked  Imaging studies: imaging studies available  Patient identity confirmed: verbally with patient  Time out: Immediately prior to procedure a \"time out\" was called to verify the correct patient, procedure, equipment, support staff and site/side marked as required.  Preparation: Patient was prepped and draped in the usual sterile fashion.  Local anesthesia used: yes    Anesthesia:  Local anesthesia used: yes  Local Anesthetic: lidocaine 2% without epinephrine  Anesthetic total: 0.5 mL    Sedation:  Patient sedated: no    Medications administered: 0.5 mL lidocaine 2%; 10 mg triamcinolone acetonide 40 MG/ML        Assessment and Plan   Diagnoses and all orders for this visit:    1. Trigger middle finger of left hand (Primary)    2. Left hand pain    Other orders  -     - Injection Tendon or Ligament       Discussion of orthopedic goals  Orthopedic activities reviewed and patient expressed appreciation  Risk, benefits, and merits of treatment alternatives reviewed with the patient and questions answered  Patient guided on mobility and conditioning exercises  Regular exercise as tolerated  Ice, heat, and/or modalities as needed and beneficial  Advised patient to call or notify the office for any adverse effect from injection therapy  Patient was counseled and provided with a HEP.   Discussed gentle range of motion activities/exercises for the affected joint with the patient.    Reduced physical activity as appropriate and " avoid aggravating activities.   Weight bearing parameters reviewed.     Recommendations/Plan:  Exercise, medications, injections, other patient advice, and return appointment as noted.  Patient and/or guardian(s) were encouraged to call or return to the office for any issues or concerns.    Patient was given a cortisone injection to the volar base of the left middle finger for symptomatic relief of trigger finger.  This is his first injection for trigger finger into the left middle finger.  I advised reduced activity with the left hand over the next couple of weeks and suggested reduce repetitive activities.  Offered a splint to wear at night for trigger finger though patient declines.  I advised follow-up with me in 6 weeks if symptoms persist.    Return in about 6 weeks (around 3/12/2025), or if symptoms worsen or fail to improve.

## 2025-02-03 ENCOUNTER — PATIENT ROUNDING (BHMG ONLY) (OUTPATIENT)
Dept: ORTHOPEDIC SURGERY | Facility: CLINIC | Age: 65
End: 2025-02-03
Payer: COMMERCIAL

## 2025-02-03 NOTE — PROGRESS NOTES
"February 3, 2025    Hello, may I speak with Derrick Rhoades?    My name is Shayla      I am  with [facility]  [address] [place] [id number] [phone].    Before we get started may I verify your date of birth? 1960    I am calling to officially welcome you to our practice and ask about your recent visit. Is this a good time to talk? yes    Tell me about your visit with us. What things went well?  \"appointment went good\"       We're always looking for ways to make our patients' experiences even better. Do you have recommendations on ways we may improve?  no    Overall were you satisfied with your first visit to our practice? yes       I appreciate you taking the time to speak with me today. Is there anything else I can do for you? no      Thank you, and have a great day.      "

## 2025-02-20 DIAGNOSIS — G25.81 RLS (RESTLESS LEGS SYNDROME): ICD-10-CM

## 2025-02-20 RX ORDER — ROPINIROLE 0.25 MG/1
TABLET, FILM COATED ORAL
Qty: 60 TABLET | Refills: 2 | Status: SHIPPED | OUTPATIENT
Start: 2025-02-20

## 2025-02-28 DIAGNOSIS — M19.079 ARTHRITIS OF BIG TOE: ICD-10-CM

## 2025-02-28 RX ORDER — MELOXICAM 15 MG/1
15 TABLET ORAL DAILY
Qty: 90 TABLET | Refills: 1 | Status: SHIPPED | OUTPATIENT
Start: 2025-02-28

## 2025-04-09 DIAGNOSIS — I10 PRIMARY HYPERTENSION: ICD-10-CM

## 2025-04-09 RX ORDER — LOSARTAN POTASSIUM 50 MG/1
50 TABLET ORAL DAILY
Qty: 30 TABLET | Refills: 3 | Status: SHIPPED | OUTPATIENT
Start: 2025-04-09

## 2025-05-07 ENCOUNTER — OFFICE VISIT (OUTPATIENT)
Age: 65
End: 2025-05-07
Payer: COMMERCIAL

## 2025-05-07 VITALS
RESPIRATION RATE: 16 BRPM | OXYGEN SATURATION: 95 % | BODY MASS INDEX: 37.56 KG/M2 | WEIGHT: 247.8 LBS | HEART RATE: 56 BPM | TEMPERATURE: 97.8 F | HEIGHT: 68 IN | DIASTOLIC BLOOD PRESSURE: 77 MMHG | SYSTOLIC BLOOD PRESSURE: 142 MMHG

## 2025-05-07 DIAGNOSIS — M65.332 TRIGGER MIDDLE FINGER OF LEFT HAND: ICD-10-CM

## 2025-05-07 DIAGNOSIS — Z13.29 THYROID DISORDER SCREEN: ICD-10-CM

## 2025-05-07 DIAGNOSIS — E55.9 VITAMIN D DEFICIENCY: ICD-10-CM

## 2025-05-07 DIAGNOSIS — I10 PRIMARY HYPERTENSION: Primary | ICD-10-CM

## 2025-05-07 DIAGNOSIS — E78.00 PURE HYPERCHOLESTEROLEMIA: ICD-10-CM

## 2025-05-07 PROCEDURE — 99214 OFFICE O/P EST MOD 30 MIN: CPT | Performed by: NURSE PRACTITIONER

## 2025-05-07 PROCEDURE — 3077F SYST BP >= 140 MM HG: CPT | Performed by: NURSE PRACTITIONER

## 2025-05-07 PROCEDURE — 3078F DIAST BP <80 MM HG: CPT | Performed by: NURSE PRACTITIONER

## 2025-05-07 RX ORDER — LOSARTAN POTASSIUM 100 MG/1
100 TABLET ORAL DAILY
Qty: 90 TABLET | Refills: 1 | Status: SHIPPED | OUTPATIENT
Start: 2025-05-07

## 2025-05-07 SDOH — ECONOMIC STABILITY: FOOD INSECURITY: WITHIN THE PAST 12 MONTHS, YOU WORRIED THAT YOUR FOOD WOULD RUN OUT BEFORE YOU GOT MONEY TO BUY MORE.: NEVER TRUE

## 2025-05-07 SDOH — ECONOMIC STABILITY: FOOD INSECURITY: WITHIN THE PAST 12 MONTHS, THE FOOD YOU BOUGHT JUST DIDN'T LAST AND YOU DIDN'T HAVE MONEY TO GET MORE.: NEVER TRUE

## 2025-05-07 ASSESSMENT — PATIENT HEALTH QUESTIONNAIRE - PHQ9
SUM OF ALL RESPONSES TO PHQ QUESTIONS 1-9: 0
2. FEELING DOWN, DEPRESSED OR HOPELESS: NOT AT ALL
SUM OF ALL RESPONSES TO PHQ QUESTIONS 1-9: 0
1. LITTLE INTEREST OR PLEASURE IN DOING THINGS: NOT AT ALL

## 2025-05-07 NOTE — PROGRESS NOTES
Chief Complaint   Patient presents with    Hypertension    Hyperlipidemia       Have you seen any other physician or provider since your last visit yes - Orthopedic     Have you had any other diagnostic tests since your last visit? no    Have you changed or stopped any medications since your last visit? no        SUBJECTIVE:    Patient ID:Timur Montenegro is a 65 y.o. male.    Chief Complaint   Patient presents with    Hypertension    Hyperlipidemia     HPI:  Patient is here for his 6 month follow up on blood pressure and cholesterol.   History of Present Illness  The patient presents for evaluation of trigger finger and elevated blood pressure.    He has been experiencing persistent pain in his hips and legs for the past 90 days, which he attributes to arthritis. The pain intensifies during rainy weather and at night. He also reports back pain, which was particularly severe on Sunday. He found relief from the pain by using a heated seat while driving into town.    The patient has been managing his trigger finger condition but believes it may be more than just that. He experiences severe pain in his wrist, which he suspects might be due to arthritis. He has been informed that the issue could be related to a narrowing of the nerve sheath, causing compression of the nerves in his fingers.    He is currently on losartan 50 mg for blood pressure management, which he took today. He acknowledges that his dietary intake this morning was suboptimal, consisting of sausage, country ham, and eggs.      Patient's medications, allergies, past medical, surgical, social and family histories were reviewed and updated as appropriate.          Review of Systems   Constitutional: Negative.    HENT: Negative.     Eyes: Negative.    Respiratory: Negative.     Cardiovascular: Negative.    Gastrointestinal: Negative.    Endocrine: Negative.    Genitourinary: Negative.    Musculoskeletal: Negative.    Skin: Negative.

## 2025-05-16 DIAGNOSIS — G25.81 RLS (RESTLESS LEGS SYNDROME): ICD-10-CM

## 2025-05-16 RX ORDER — ROPINIROLE 0.25 MG/1
TABLET, FILM COATED ORAL
Qty: 60 TABLET | Refills: 2 | Status: SHIPPED | OUTPATIENT
Start: 2025-05-16

## 2025-07-08 DIAGNOSIS — I10 PRIMARY HYPERTENSION: ICD-10-CM

## 2025-07-08 RX ORDER — LOSARTAN POTASSIUM 50 MG/1
50 TABLET ORAL DAILY
Qty: 30 TABLET | Refills: 3 | OUTPATIENT
Start: 2025-07-08

## 2025-08-11 DIAGNOSIS — G25.81 RLS (RESTLESS LEGS SYNDROME): ICD-10-CM

## 2025-08-11 RX ORDER — ROPINIROLE 0.25 MG/1
TABLET, FILM COATED ORAL
Qty: 60 TABLET | Refills: 2 | Status: SHIPPED | OUTPATIENT
Start: 2025-08-11

## 2025-08-20 ENCOUNTER — TELEPHONE (OUTPATIENT)
Age: 65
End: 2025-08-20

## 2025-08-20 DIAGNOSIS — I10 PRIMARY HYPERTENSION: ICD-10-CM

## 2025-08-20 RX ORDER — LOSARTAN POTASSIUM 100 MG/1
100 TABLET ORAL DAILY
Qty: 90 TABLET | Refills: 0 | Status: SHIPPED | OUTPATIENT
Start: 2025-08-20

## 2025-08-25 ENCOUNTER — LAB (OUTPATIENT)
Dept: LAB | Facility: HOSPITAL | Age: 65
End: 2025-08-25
Payer: COMMERCIAL

## 2025-08-25 ENCOUNTER — TRANSCRIBE ORDERS (OUTPATIENT)
Dept: LAB | Facility: HOSPITAL | Age: 65
End: 2025-08-25
Payer: COMMERCIAL

## 2025-08-25 DIAGNOSIS — E55.9 VITAMIN D DEFICIENCY: ICD-10-CM

## 2025-08-25 DIAGNOSIS — Z13.29 SCREENING FOR THYROID DISORDER: ICD-10-CM

## 2025-08-25 DIAGNOSIS — I10 ESSENTIAL (PRIMARY) HYPERTENSION: ICD-10-CM

## 2025-08-25 DIAGNOSIS — E55.9 VITAMIN D DEFICIENCY: Primary | ICD-10-CM

## 2025-08-25 LAB
25(OH)D3 SERPL-MCNC: 24.2 NG/ML (ref 30–100)
ALBUMIN SERPL-MCNC: 4.3 G/DL (ref 3.5–5.2)
ALBUMIN/GLOB SERPL: 1.4 G/DL
ALP SERPL-CCNC: 98 U/L (ref 39–117)
ALT SERPL W P-5'-P-CCNC: 25 U/L (ref 1–41)
ANION GAP SERPL CALCULATED.3IONS-SCNC: 12 MMOL/L (ref 5–15)
AST SERPL-CCNC: 25 U/L (ref 1–40)
BILIRUB SERPL-MCNC: 0.5 MG/DL (ref 0–1.2)
BUN SERPL-MCNC: 23 MG/DL (ref 8–23)
BUN/CREAT SERPL: 22.8 (ref 7–25)
CALCIUM SPEC-SCNC: 10 MG/DL (ref 8.6–10.5)
CHLORIDE SERPL-SCNC: 103 MMOL/L (ref 98–107)
CHOLEST SERPL-MCNC: 151 MG/DL (ref 0–200)
CO2 SERPL-SCNC: 22 MMOL/L (ref 22–29)
CREAT SERPL-MCNC: 1.01 MG/DL (ref 0.76–1.27)
DEPRECATED RDW RBC AUTO: 44.6 FL (ref 37–54)
EGFRCR SERPLBLD CKD-EPI 2021: 83 ML/MIN/1.73
ERYTHROCYTE [DISTWIDTH] IN BLOOD BY AUTOMATED COUNT: 13.4 % (ref 12.3–15.4)
FOLATE SERPL-MCNC: 14.1 NG/ML (ref 4.78–24.2)
GLOBULIN UR ELPH-MCNC: 3.1 GM/DL
GLUCOSE SERPL-MCNC: 104 MG/DL (ref 65–99)
HCT VFR BLD AUTO: 43.8 % (ref 37.5–51)
HDLC SERPL-MCNC: 25 MG/DL (ref 40–60)
HGB BLD-MCNC: 14.3 G/DL (ref 13–17.7)
LDLC SERPL CALC-MCNC: 102 MG/DL (ref 0–100)
LDLC/HDLC SERPL: 3.98 {RATIO}
MCH RBC QN AUTO: 29.6 PG (ref 26.6–33)
MCHC RBC AUTO-ENTMCNC: 32.6 G/DL (ref 31.5–35.7)
MCV RBC AUTO: 90.7 FL (ref 79–97)
PLATELET # BLD AUTO: 235 10*3/MM3 (ref 140–450)
PMV BLD AUTO: 10.8 FL (ref 6–12)
POTASSIUM SERPL-SCNC: 4.2 MMOL/L (ref 3.5–5.2)
PROT SERPL-MCNC: 7.4 G/DL (ref 6–8.5)
RBC # BLD AUTO: 4.83 10*6/MM3 (ref 4.14–5.8)
SODIUM SERPL-SCNC: 137 MMOL/L (ref 136–145)
TRIGL SERPL-MCNC: 133 MG/DL (ref 0–150)
TSH SERPL DL<=0.05 MIU/L-ACNC: 3.09 UIU/ML (ref 0.27–4.2)
VIT B12 BLD-MCNC: 361 PG/ML (ref 211–946)
VLDLC SERPL-MCNC: 24 MG/DL (ref 5–40)
WBC NRBC COR # BLD AUTO: 6.17 10*3/MM3 (ref 3.4–10.8)

## 2025-08-25 PROCEDURE — 82607 VITAMIN B-12: CPT

## 2025-08-25 PROCEDURE — 82746 ASSAY OF FOLIC ACID SERUM: CPT

## 2025-08-25 PROCEDURE — 80050 GENERAL HEALTH PANEL: CPT

## 2025-08-25 PROCEDURE — 36415 COLL VENOUS BLD VENIPUNCTURE: CPT

## 2025-08-25 PROCEDURE — 80061 LIPID PANEL: CPT

## 2025-08-25 PROCEDURE — 82306 VITAMIN D 25 HYDROXY: CPT

## 2025-08-26 ENCOUNTER — OFFICE VISIT (OUTPATIENT)
Age: 65
End: 2025-08-26
Payer: COMMERCIAL

## 2025-08-26 VITALS
HEART RATE: 66 BPM | RESPIRATION RATE: 16 BRPM | SYSTOLIC BLOOD PRESSURE: 111 MMHG | DIASTOLIC BLOOD PRESSURE: 67 MMHG | TEMPERATURE: 97.7 F | HEIGHT: 68 IN | BODY MASS INDEX: 37.33 KG/M2 | OXYGEN SATURATION: 96 % | WEIGHT: 246.3 LBS

## 2025-08-26 DIAGNOSIS — E55.9 VITAMIN D DEFICIENCY: ICD-10-CM

## 2025-08-26 DIAGNOSIS — E53.9 VITAMIN B DEFICIENCY: ICD-10-CM

## 2025-08-26 DIAGNOSIS — G25.81 RLS (RESTLESS LEGS SYNDROME): ICD-10-CM

## 2025-08-26 DIAGNOSIS — I10 PRIMARY HYPERTENSION: Primary | ICD-10-CM

## 2025-08-26 PROCEDURE — 96372 THER/PROPH/DIAG INJ SC/IM: CPT | Performed by: NURSE PRACTITIONER

## 2025-08-26 PROCEDURE — 3078F DIAST BP <80 MM HG: CPT | Performed by: NURSE PRACTITIONER

## 2025-08-26 PROCEDURE — 99214 OFFICE O/P EST MOD 30 MIN: CPT | Performed by: NURSE PRACTITIONER

## 2025-08-26 PROCEDURE — 3074F SYST BP LT 130 MM HG: CPT | Performed by: NURSE PRACTITIONER

## 2025-08-26 RX ORDER — CYANOCOBALAMIN 1000 UG/ML
1000 INJECTION, SOLUTION INTRAMUSCULAR; SUBCUTANEOUS ONCE
Status: COMPLETED | OUTPATIENT
Start: 2025-08-26 | End: 2025-08-26

## 2025-08-26 RX ORDER — LOSARTAN POTASSIUM 50 MG/1
50 TABLET ORAL DAILY
Qty: 90 TABLET | Refills: 2 | Status: SHIPPED | OUTPATIENT
Start: 2025-08-26

## 2025-08-26 RX ADMIN — CYANOCOBALAMIN 1000 MCG: 1000 INJECTION, SOLUTION INTRAMUSCULAR; SUBCUTANEOUS at 11:18

## 2025-09-04 DIAGNOSIS — E78.00 PURE HYPERCHOLESTEROLEMIA: ICD-10-CM

## 2025-09-05 RX ORDER — PRAVASTATIN SODIUM 20 MG
20 TABLET ORAL DAILY
Qty: 90 TABLET | Refills: 1 | Status: SHIPPED | OUTPATIENT
Start: 2025-09-05